# Patient Record
Sex: MALE | Race: WHITE | NOT HISPANIC OR LATINO | Employment: UNEMPLOYED | URBAN - NONMETROPOLITAN AREA
[De-identification: names, ages, dates, MRNs, and addresses within clinical notes are randomized per-mention and may not be internally consistent; named-entity substitution may affect disease eponyms.]

---

## 2017-04-01 ENCOUNTER — HOSPITAL ENCOUNTER (INPATIENT)
Facility: HOSPITAL | Age: 27
LOS: 2 days | Discharge: HOME | End: 2017-04-03
Attending: PSYCHIATRY & NEUROLOGY | Admitting: PSYCHIATRY & NEUROLOGY

## 2017-04-01 ENCOUNTER — HOSPITAL ENCOUNTER (EMERGENCY)
Facility: HOSPITAL | Age: 27
Discharge: STILL ADMITTED OR ADMINISTRATIVE | End: 2017-04-01
Attending: FAMILY MEDICINE

## 2017-04-01 VITALS
OXYGEN SATURATION: 99 % | WEIGHT: 190 LBS | DIASTOLIC BLOOD PRESSURE: 67 MMHG | TEMPERATURE: 97.9 F | RESPIRATION RATE: 16 BRPM | HEART RATE: 93 BPM | SYSTOLIC BLOOD PRESSURE: 120 MMHG | BODY MASS INDEX: 24.38 KG/M2 | HEIGHT: 74 IN

## 2017-04-01 DIAGNOSIS — R44.3 HALLUCINATION: Primary | ICD-10-CM

## 2017-04-01 DIAGNOSIS — R45.851 SUICIDAL IDEATION: ICD-10-CM

## 2017-04-01 PROBLEM — F33.3 MDD (MAJOR DEPRESSIVE DISORDER), RECURRENT, SEVERE, WITH PSYCHOSIS: Status: ACTIVE | Noted: 2017-04-01

## 2017-04-01 LAB
ALBUMIN SERPL-MCNC: 5.5 G/DL (ref 3.5–5)
ALBUMIN/GLOB SERPL: 1.9 G/DL (ref 1.5–2.5)
ALP SERPL-CCNC: 57 U/L (ref 40–129)
ALT SERPL W P-5'-P-CCNC: 10 U/L (ref 10–44)
AMPHET+METHAMPHET UR QL: POSITIVE
ANION GAP SERPL CALCULATED.3IONS-SCNC: 9.4 MMOL/L (ref 3.6–11.2)
AST SERPL-CCNC: 16 U/L (ref 10–34)
BARBITURATES UR QL SCN: NEGATIVE
BASOPHILS # BLD AUTO: 0.02 10*3/MM3 (ref 0–0.3)
BASOPHILS NFR BLD AUTO: 0.3 % (ref 0–2)
BENZODIAZ UR QL SCN: NEGATIVE
BILIRUB SERPL-MCNC: 0.7 MG/DL (ref 0.2–1.8)
BILIRUB UR QL STRIP: NEGATIVE
BUN BLD-MCNC: 12 MG/DL (ref 7–21)
BUN/CREAT SERPL: 10.7 (ref 7–25)
CALCIUM SPEC-SCNC: 10.3 MG/DL (ref 7.7–10)
CANNABINOIDS SERPL QL: POSITIVE
CHLORIDE SERPL-SCNC: 104 MMOL/L (ref 99–112)
CLARITY UR: CLEAR
CO2 SERPL-SCNC: 26.6 MMOL/L (ref 24.3–31.9)
COCAINE UR QL: NEGATIVE
COLOR UR: ABNORMAL
CREAT BLD-MCNC: 1.12 MG/DL (ref 0.43–1.29)
DEPRECATED RDW RBC AUTO: 42 FL (ref 37–54)
EOSINOPHIL # BLD AUTO: 0.06 10*3/MM3 (ref 0–0.7)
EOSINOPHIL NFR BLD AUTO: 0.8 % (ref 0–5)
ERYTHROCYTE [DISTWIDTH] IN BLOOD BY AUTOMATED COUNT: 13.4 % (ref 11.5–14.5)
ETHANOL BLD-MCNC: <10 MG/DL
ETHANOL UR QL: <0.01 %
GFR SERPL CREATININE-BSD FRML MDRD: 79 ML/MIN/1.73
GLOBULIN UR ELPH-MCNC: 2.9 GM/DL
GLUCOSE BLD-MCNC: 103 MG/DL (ref 70–110)
GLUCOSE UR STRIP-MCNC: NEGATIVE MG/DL
HAV IGM SERPL QL IA: NORMAL
HBV CORE IGM SERPL QL IA: NORMAL
HBV SURFACE AG SERPL QL IA: NORMAL
HCT VFR BLD AUTO: 43.9 % (ref 42–52)
HCV AB SER DONR QL: NORMAL
HGB BLD-MCNC: 15.3 G/DL (ref 14–18)
HGB UR QL STRIP.AUTO: NEGATIVE
HIV1+2 AB SER QL: NORMAL
IMM GRANULOCYTES # BLD: 0.01 10*3/MM3 (ref 0–0.03)
IMM GRANULOCYTES NFR BLD: 0.1 % (ref 0–0.5)
KETONES UR QL STRIP: ABNORMAL
LEUKOCYTE ESTERASE UR QL STRIP.AUTO: NEGATIVE
LYMPHOCYTES # BLD AUTO: 1.68 10*3/MM3 (ref 1–3)
LYMPHOCYTES NFR BLD AUTO: 21.3 % (ref 21–51)
MCH RBC QN AUTO: 29.9 PG (ref 27–33)
MCHC RBC AUTO-ENTMCNC: 34.9 G/DL (ref 33–37)
MCV RBC AUTO: 85.7 FL (ref 80–94)
METHADONE UR QL SCN: NEGATIVE
MONOCYTES # BLD AUTO: 1 10*3/MM3 (ref 0.1–0.9)
MONOCYTES NFR BLD AUTO: 12.7 % (ref 0–10)
NEUTROPHILS # BLD AUTO: 5.12 10*3/MM3 (ref 1.4–6.5)
NEUTROPHILS NFR BLD AUTO: 64.8 % (ref 30–70)
NITRITE UR QL STRIP: NEGATIVE
OPIATES UR QL: NEGATIVE
OSMOLALITY SERPL CALC.SUM OF ELEC: 279.4 MOSM/KG (ref 273–305)
OXYCODONE UR QL SCN: NEGATIVE
PCP UR QL SCN: NEGATIVE
PH UR STRIP.AUTO: 6.5 [PH] (ref 5–8)
PLATELET # BLD AUTO: 268 10*3/MM3 (ref 130–400)
PMV BLD AUTO: 10.5 FL (ref 6–10)
POTASSIUM BLD-SCNC: 4.5 MMOL/L (ref 3.5–5.3)
PROPOXYPH UR QL: NEGATIVE
PROT SERPL-MCNC: 8.4 G/DL (ref 6–8)
PROT UR QL STRIP: ABNORMAL
RBC # BLD AUTO: 5.12 10*6/MM3 (ref 4.7–6.1)
SODIUM BLD-SCNC: 140 MMOL/L (ref 135–153)
SP GR UR STRIP: 1.03 (ref 1–1.03)
UROBILINOGEN UR QL STRIP: ABNORMAL
WBC NRBC COR # BLD: 7.89 10*3/MM3 (ref 4.5–12.5)

## 2017-04-01 PROCEDURE — 80074 ACUTE HEPATITIS PANEL: CPT | Performed by: PSYCHIATRY & NEUROLOGY

## 2017-04-01 PROCEDURE — G0432 EIA HIV-1/HIV-2 SCREEN: HCPCS | Performed by: PSYCHIATRY & NEUROLOGY

## 2017-04-01 RX ORDER — ACETAMINOPHEN 325 MG/1
650 TABLET ORAL EVERY 4 HOURS PRN
Status: DISCONTINUED | OUTPATIENT
Start: 2017-04-01 | End: 2017-04-03 | Stop reason: HOSPADM

## 2017-04-01 RX ORDER — ONDANSETRON 4 MG/1
4 TABLET, FILM COATED ORAL EVERY 6 HOURS PRN
Status: DISCONTINUED | OUTPATIENT
Start: 2017-04-01 | End: 2017-04-03 | Stop reason: HOSPADM

## 2017-04-01 RX ORDER — NICOTINE 21 MG/24HR
1 PATCH, TRANSDERMAL 24 HOURS TRANSDERMAL DAILY
Status: DISCONTINUED | OUTPATIENT
Start: 2017-04-01 | End: 2017-04-03 | Stop reason: HOSPADM

## 2017-04-01 RX ORDER — BENZTROPINE MESYLATE 1 MG/1
1 TABLET ORAL DAILY PRN
Status: DISCONTINUED | OUTPATIENT
Start: 2017-04-01 | End: 2017-04-03 | Stop reason: HOSPADM

## 2017-04-01 RX ORDER — HYDROXYZINE 50 MG/1
50 TABLET, FILM COATED ORAL EVERY 6 HOURS PRN
Status: DISCONTINUED | OUTPATIENT
Start: 2017-04-01 | End: 2017-04-03 | Stop reason: HOSPADM

## 2017-04-01 RX ORDER — BENZTROPINE MESYLATE 1 MG/ML
0.5 INJECTION INTRAMUSCULAR; INTRAVENOUS DAILY PRN
Status: DISCONTINUED | OUTPATIENT
Start: 2017-04-01 | End: 2017-04-03 | Stop reason: HOSPADM

## 2017-04-01 RX ORDER — ECHINACEA PURPUREA EXTRACT 125 MG
2 TABLET ORAL AS NEEDED
Status: DISCONTINUED | OUTPATIENT
Start: 2017-04-01 | End: 2017-04-03 | Stop reason: HOSPADM

## 2017-04-01 RX ORDER — TRAZODONE HYDROCHLORIDE 50 MG/1
50 TABLET ORAL NIGHTLY PRN
Status: DISCONTINUED | OUTPATIENT
Start: 2017-04-01 | End: 2017-04-03 | Stop reason: HOSPADM

## 2017-04-01 RX ORDER — BENZONATATE 100 MG/1
100 CAPSULE ORAL 3 TIMES DAILY PRN
Status: DISCONTINUED | OUTPATIENT
Start: 2017-04-01 | End: 2017-04-03 | Stop reason: HOSPADM

## 2017-04-01 RX ORDER — ALUMINA, MAGNESIA, AND SIMETHICONE 2400; 2400; 240 MG/30ML; MG/30ML; MG/30ML
15 SUSPENSION ORAL EVERY 6 HOURS PRN
Status: DISCONTINUED | OUTPATIENT
Start: 2017-04-01 | End: 2017-04-03 | Stop reason: HOSPADM

## 2017-04-01 RX ORDER — FAMOTIDINE 20 MG/1
20 TABLET, FILM COATED ORAL 2 TIMES DAILY PRN
Status: DISCONTINUED | OUTPATIENT
Start: 2017-04-01 | End: 2017-04-03 | Stop reason: HOSPADM

## 2017-04-01 RX ORDER — MULTIVITAMIN
1 TABLET ORAL DAILY
Status: DISCONTINUED | OUTPATIENT
Start: 2017-04-01 | End: 2017-04-03 | Stop reason: HOSPADM

## 2017-04-01 RX ORDER — LOPERAMIDE HYDROCHLORIDE 2 MG/1
2 CAPSULE ORAL 4 TIMES DAILY PRN
Status: DISCONTINUED | OUTPATIENT
Start: 2017-04-01 | End: 2017-04-03 | Stop reason: HOSPADM

## 2017-04-01 RX ADMIN — HYDROXYZINE HYDROCHLORIDE 50 MG: 50 TABLET ORAL at 19:28

## 2017-04-01 RX ADMIN — TRAZODONE HYDROCHLORIDE 50 MG: 50 TABLET ORAL at 21:03

## 2017-04-01 RX ADMIN — Medication 1 TABLET: at 17:41

## 2017-04-01 RX ADMIN — NICOTINE 1 PATCH: 21 PATCH TRANSDERMAL at 17:40

## 2017-04-01 NOTE — NURSING NOTE
Patient pockets emptied. Thorough search complete. Undergarments searched by intake nurse and was witnessed by Seven  Per ED policy 100. The patient was placed in hospital attire. Belongings were logged on personal belongings sheet. Room was swept for any potential safety hazards room cleared and patient placed in treatment room

## 2017-04-02 RX ORDER — RISPERIDONE 0.25 MG/1
0.5 TABLET ORAL EVERY 12 HOURS SCHEDULED
Status: DISCONTINUED | OUTPATIENT
Start: 2017-04-02 | End: 2017-04-03 | Stop reason: HOSPADM

## 2017-04-02 RX ADMIN — TRAZODONE HYDROCHLORIDE 50 MG: 50 TABLET ORAL at 20:42

## 2017-04-02 RX ADMIN — HYDROXYZINE HYDROCHLORIDE 50 MG: 50 TABLET ORAL at 18:15

## 2017-04-02 RX ADMIN — RISPERIDONE 0.5 MG: 0.25 TABLET ORAL at 11:40

## 2017-04-02 RX ADMIN — NICOTINE 1 PATCH: 21 PATCH TRANSDERMAL at 09:12

## 2017-04-02 RX ADMIN — RISPERIDONE 0.5 MG: 0.25 TABLET ORAL at 20:42

## 2017-04-02 RX ADMIN — HYDROXYZINE HYDROCHLORIDE 50 MG: 50 TABLET ORAL at 09:36

## 2017-04-02 NOTE — PLAN OF CARE
Problem:  Patient Care Overview (Adult)  Goal: Plan of Care Review  Outcome: Ongoing (interventions implemented as appropriate)    04/02/17 1139   Coping/Psychosocial Response Interventions   Plan Of Care Reviewed With patient   Patient Care Overview   Progress improving   Coping/Psychosocial   Patient Agreement with Plan of Care agrees   Outcome Evaluation   Outcome Summary/Follow up Plan PT REPORTS DIFFICULTY STAYING ASLEEP LAST NIGHT. DENIES ANY SI, HI, OR HALLUCINATIONS. PT RATES ANXIETY 5 AND DEPRESSION 7 ON 0/10 SCALE. DENIES ANY WITHDRAWAL/CRAVING. PT IS WITHDRAWAN THIS SHIFT.          Problem:  Overarching Goals  Goal: Adheres to Safety Considerations for Self and Others  Outcome: Ongoing (interventions implemented as appropriate)  Goal: Optimized Coping Skills in Response to Life Stressors  Outcome: Ongoing (interventions implemented as appropriate)  Goal: Develops/Participates in Therapeutic [place] to Support Successful Transition  Outcome: Ongoing (interventions implemented as appropriate)

## 2017-04-02 NOTE — PLAN OF CARE
Problem:  Patient Care Overview (Adult)  Goal: Individualization and Mutuality  Outcome: Ongoing (interventions implemented as appropriate)    04/01/17 1626 04/02/17 1115   Behavioral Health Screens   Patient Personal Strengths --  motivated for treatment;motivated for recovery;spiritual/Gnosticism support;stable living environment   Patient Personal Strengths Comment --  willing to seek help    Patient Vulnerabilities --  ineffective coping    Individualization   Patient Specific Preferences --  none   Patient Specific Goals --  to get treatment    Patient Specific Interventions --  Individual and group therapy to focus on healthy coing and follow up care    Mutuality/Individual Preferences   What Anxieties, Fears or Concerns Do You Have About Your Health or Care? Anxiety about being away from wife- has not been apart in 5 years --    What Questions Do You Have About Your Health or Care? questions regarding LOS --    What Information Would Help Us Give You More Personalized Care? nothing --        Goal: Discharge Needs Assessment  Outcome: Ongoing (interventions implemented as appropriate)    04/01/17 1626 04/02/17 1115   Discharge Needs Assessment   Concerns To Be Addressed --  mental health concerns;relationship concerns;substance/tobacco abuse/use concerns   Readmission Within The Last 30 Days --  no previous admission in last 30 days   Community Agency Name(S) --  considering options    Current Discharge Risk --  psychiatric illness;substance abuse   Discharge Planning Comments --  to be determined    Discharge Needs Assessment   Outpatient/Agency/Support Group Needs --  outpatient substance abuse treatment (specify);outpatient psychiatric care (specify)   Anticipated Discharge Disposition --  home with family   Discharge Disposition --  home with family   Living Environment   Transportation Available car --       Met with patient for individual as well as with treatment team introduced self as assigned  therapist he was agreeable. Completed PSA  Treatment  plan and integrated summary. Discussed treatment objectives and briefly discussed disposition plans.      The patient presented to the ER having suicidal thoughts for the past 2 days with a plan to cut himself or to hang himself. He reports hearing voices talking to him from the TV telling him they are going to bury him in a hole and burn him. He reports smoking MJ since age [age] and using meth since age 11. He reports he smokes meth about once per mo 10.00 worth. He reports arguing with his wife about finances and the children.      Treatment team to stabilize patients symptoms, provide 24 hr nursing supervision and provide individual and group therapy to focus on healthy coping and follow up care. Patient will be encouraged to involve his family in his treatment.

## 2017-04-02 NOTE — PLAN OF CARE
Problem: BH Patient Care Overview (Adult)  Goal: Plan of Care Review  Outcome: Ongoing (interventions implemented as appropriate)  Pt is calm and cooperative. Rates anxiety 9/10 and depression 4/10. Denies A/V hallucinations although appears to respond to internal stimuli. Psychomotor agitation noted. Pt denies SI/HI. Denies craving/withdrawals. Slept well.    04/02/17 0408   Coping/Psychosocial Response Interventions   Plan Of Care Reviewed With patient   Patient Care Overview   Progress no change   Coping/Psychosocial   Patient Agreement with Plan of Care agrees
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
## 2017-04-03 VITALS
RESPIRATION RATE: 18 BRPM | HEIGHT: 74 IN | OXYGEN SATURATION: 99 % | BODY MASS INDEX: 21.66 KG/M2 | WEIGHT: 168.8 LBS | SYSTOLIC BLOOD PRESSURE: 106 MMHG | DIASTOLIC BLOOD PRESSURE: 59 MMHG | TEMPERATURE: 97 F | HEART RATE: 62 BPM

## 2017-04-03 LAB — BACTERIA SPEC AEROBE CULT: NORMAL

## 2017-04-03 PROCEDURE — 99238 HOSP IP/OBS DSCHRG MGMT 30/<: CPT | Performed by: PSYCHIATRY & NEUROLOGY

## 2017-04-03 RX ORDER — RISPERIDONE 0.5 MG/1
0.5 TABLET ORAL EVERY 12 HOURS SCHEDULED
Qty: 60 TABLET | Refills: 0 | Status: SHIPPED | OUTPATIENT
Start: 2017-04-03 | End: 2017-04-03

## 2017-04-03 RX ORDER — RISPERIDONE 1 MG/1
1 TABLET ORAL EVERY 12 HOURS SCHEDULED
Qty: 60 TABLET | Refills: 0 | Status: ON HOLD | OUTPATIENT
Start: 2017-04-03 | End: 2017-10-25

## 2017-04-03 RX ORDER — HYDROXYZINE 50 MG/1
50 TABLET, FILM COATED ORAL EVERY 6 HOURS PRN
Qty: 90 TABLET | Refills: 0 | Status: ON HOLD | OUTPATIENT
Start: 2017-04-03 | End: 2017-10-25

## 2017-04-03 RX ORDER — TRAZODONE HYDROCHLORIDE 50 MG/1
50 TABLET ORAL NIGHTLY PRN
Qty: 30 TABLET | Refills: 0 | Status: ON HOLD | OUTPATIENT
Start: 2017-04-03 | End: 2017-10-25

## 2017-04-03 RX ADMIN — NICOTINE 1 PATCH: 21 PATCH TRANSDERMAL at 09:47

## 2017-04-03 RX ADMIN — HYDROXYZINE HYDROCHLORIDE 50 MG: 50 TABLET ORAL at 14:06

## 2017-04-03 NOTE — DISCHARGE INSTR - APPOINTMENTS
ROXANNE Hernandez  [address]  [place] [address]  [phone]    April 5 2017 at 2:00pm with Aide

## 2017-04-03 NOTE — PROGRESS NOTES
10:45  Met with patient for individual he reports feeling better today. He discussed his reason for admission and states he feels like he used flakka or bath salts rather than meth and this contributed to his symptoms. He discussed stressors as financial problems and not working for the past 2 weeks. He discussed his long history of substance abuse however is agreeable to follow up with outpatient care.  He feels like he is ready to go home today and is agreeable to follow up with ROXANNE Hernandez. We discussed relapse prevention and AA/NA meetings and ways to reduce stress rather than use substances.     Patient denies suicidal thoughts, denies A/V hallucinations. He rates anxiety at 3 and denies depression. He feels like medications has helped him as well as improved sleep.       Patient is agreeable to follow up with ROXANNE Hernandez.

## 2017-04-03 NOTE — DISCHARGE SUMMARY
Date of Discharge:  4/3/2017    Discharge Diagnosis:Active Problems:    MDD (major depressive disorder), recurrent, severe, with psychosis  Methamphetamine abuse  Cannabis abuse      Presenting Problem/History of Present Illness  MDD (major depressive disorder), recurrent, severe, with psychosis [F33.3]     Hospital Course  Patient is a 26 y.o. male presented with depression and methamphetamine abuse.  He also presented with paranoia and auditory hallucinations regarding the TV.  He was admitted for safety and stabilization and started on risperidone 0.5 mg twice daily.  He found this, trazodone, and hydroxyzine very helpful.  On the second hospital day, he felt that his mood was improved and denied auditory or visual hallucinations.  He also denied beingparanoid.  He still had some mild paranoia regarding the possibility that his mother was a recording when he talked to her on the phone the night prior to discharge.  On the day of discharge, he recognizes that this may be was in his head, he denied other paranoid thoughts and he did not demonstrate any other paranoia during my evaluation.  On day of discharge, his affect was euthymic and stable, his thought content was goal directed, his thought process was linear, he denied suicidal or homicidal ideation and denied perceptual disturbances.  He requested to discharge home and at this point it was felt that he was not holdable and he was willing to get additional outpatient treatment.  Risperdal was increased to 1 mg twice daily on day of discharge.  It was thought that many of his symptoms where is the result of methamphetamine abuse and we also encouraged him to consider outpatient treatment.  He was only interested in NA at time of discharge.  He will follow up with comprehensive care in [place]..    Procedures Performed         Consults:   Consults     No orders found from 3/3/2017 to 4/2/2017.          Pertinent Test Results: labs: CMP, CBC, UA were  unremarkable . HIV, Hepatitis panel were negative. UDS was positive for amphetamines and THC    Condition on Discharge:  stable    Vital Signs  Temp:  [97.6 °F (36.4 °C)-98 °F (36.7 °C)] 97.6 °F (36.4 °C)  Heart Rate:  [51-61] 51  Resp:  [18] 18  BP: ()/(60-72) 96/60      Discharge Disposition  Home or Self Care    Discharge Medications   Maico Wilcox   Home Medication Instructions RASHARD:[id number]    Printed on:04/03/17 1454   Medication Information                      hydrOXYzine (ATARAX) 50 MG tablet  Take 1 tablet by mouth Every 6 (Six) Hours As Needed for Anxiety.             risperiDONE (risperDAL) 1 MG tablet  Take 1 tablet by mouth Every 12 (Twelve) Hours.             traZODone (DESYREL) 50 MG tablet  Take 1 tablet by mouth At Night As Needed for Sleep.                 Discharge Diet: regular    Activity at Discharge: as tolerated    Follow-up Appointments  Comp care in [place]      Test Results Pending at Discharge       Beka Biggs MD  04/03/17  2:59 PM

## 2017-04-03 NOTE — PLAN OF CARE
Problem: BH Patient Care Overview (Adult)  Goal: Plan of Care Review  Outcome: Ongoing (interventions implemented as appropriate)  Pt is calm and cooperative. Rates anxiety 10/10 and depression 7/10. Denies A/V hallucinations although appears to respond to internal stimuli. Psychomotor agitation noted, appears paranoid. Speech is rambling with flight of ideas. Pt denies SI/HI. Denies craving/withdrawals. Slept well through the night.     04/03/17 0436   Coping/Psychosocial Response Interventions   Plan Of Care Reviewed With patient   Patient Care Overview   Progress no change   Coping/Psychosocial   Patient Agreement with Plan of Care agrees

## 2017-04-09 NOTE — ED PROVIDER NOTES
Subjective   Patient is a 26 y.o. male presenting with mental health disorder.   History provided by:  Patient  Mental Health Problem   Presenting symptoms: hallucinations and suicidal thoughts    Onset quality:  Gradual  Timing:  Intermittent  Progression:  Worsening  Chronicity:  New  Context: stressful life event    Treatment compliance:  Untreated  Relieved by:  Nothing  Worsened by:  Nothing  Ineffective treatments:  None tried  Associated symptoms: anxiety    Risk factors: hx of mental illness        Review of Systems   Psychiatric/Behavioral: Positive for hallucinations and suicidal ideas. The patient is nervous/anxious.        Past Medical History:   Diagnosis Date   • Anxiety    • Depression    • Self-injurious behavior     over 1 year ago with burning injurious behaviors   • Substance abuse    • Suicidal thoughts    • Suicide attempt     about 6 years ago; 60 xanaxs and cut self did not have to seek medical attention    • Tumors     lymphoid tumors       Allergies   Allergen Reactions   • Reglan [Metoclopramide] Other (See Comments)     hypertension       Past Surgical History:   Procedure Laterality Date   • HAND SURGERY Left     removed a piece of his muscle       Family History   Problem Relation Age of Onset   • Anxiety disorder Mother    • Bipolar disorder Mother    • Depression Mother    • Drug abuse Mother    • OCD Mother    • Paranoid behavior Mother    • Schizophrenia Mother    • Alcohol abuse Father    • Anxiety disorder Father    • Depression Father    • Drug abuse Father    • OCD Father    • Paranoid behavior Father    • Alcohol abuse Brother    • Alcohol abuse Maternal Uncle    • Alcohol abuse Paternal Uncle    • Alcohol abuse Maternal Grandfather    • Alcohol abuse Paternal Grandfather    • Alcohol abuse Paternal Grandmother    • ADD / ADHD Neg Hx    • Dementia Neg Hx    • Seizures Neg Hx    • Self-Injurious Behavior  Neg Hx    • Suicide Attempts Neg Hx        Social History     Social  History   • Marital status:      Spouse name: N/A   • Number of children: N/A   • Years of education: N/A     Social History Main Topics   • Smoking status: Current Every Day Smoker     Packs/day: 1.00     Years: 17.00     Types: Cigarettes     Start date: 4/1/2000   • Smokeless tobacco: Current User     Types: Snuff      Comment: 1 can a week   • Alcohol use No   • Drug use: Yes     Special: Amphetamines, Marijuana   • Sexual activity: Yes     Partners: Female     Other Topics Concern   • None     Social History Narrative           Objective   Physical Exam   Constitutional: He is oriented to person, place, and time. He appears well-developed and well-nourished.   HENT:   Head: Normocephalic.   Right Ear: External ear normal.   Left Ear: External ear normal.   Nose: Nose normal.   Eyes: Pupils are equal, round, and reactive to light.   Neck: Neck supple.   Cardiovascular: Normal rate and regular rhythm.    Pulmonary/Chest: Effort normal and breath sounds normal.   Abdominal: Soft. Bowel sounds are normal.   Musculoskeletal: Normal range of motion.   Neurological: He is alert and oriented to person, place, and time.   Skin: Skin is warm and dry.   Psychiatric: His speech is normal. His mood appears anxious. He is withdrawn and actively hallucinating. Thought content is paranoid. He expresses suicidal ideation.   Nursing note and vitals reviewed.      Procedures         ED Course  ED Course                  MDM  Number of Diagnoses or Management Options  Hallucination: established and worsening  Suicidal ideation: new and requires workup     Amount and/or Complexity of Data Reviewed  Clinical lab tests: ordered and reviewed  Tests in the radiology section of CPT®: ordered and reviewed  Tests in the medicine section of CPT®: ordered and reviewed  Independent visualization of images, tracings, or specimens: yes    Risk of Complications, Morbidity, and/or Mortality  Presenting problems: moderate  Diagnostic  procedures: moderate  Management options: moderate    Patient Progress  Patient progress: stable      Final diagnoses:   Hallucination   Suicidal ideation            Lindsay Nur,   04/09/17 075

## 2017-10-25 ENCOUNTER — HOSPITAL ENCOUNTER (EMERGENCY)
Facility: HOSPITAL | Age: 27
Discharge: STILL ADMITTED OR ADMINISTRATIVE | End: 2017-10-25
Attending: FAMILY MEDICINE

## 2017-10-25 ENCOUNTER — HOSPITAL ENCOUNTER (INPATIENT)
Facility: HOSPITAL | Age: 27
LOS: 5 days | Discharge: HOME | End: 2017-10-30
Attending: FAMILY MEDICINE | Admitting: PSYCHIATRY & NEUROLOGY

## 2017-10-25 VITALS
OXYGEN SATURATION: 100 % | WEIGHT: 186 LBS | RESPIRATION RATE: 18 BRPM | SYSTOLIC BLOOD PRESSURE: 119 MMHG | HEIGHT: 74 IN | DIASTOLIC BLOOD PRESSURE: 70 MMHG | HEART RATE: 85 BPM | TEMPERATURE: 98.1 F | BODY MASS INDEX: 23.87 KG/M2

## 2017-10-25 DIAGNOSIS — F32.A DEPRESSION, UNSPECIFIED DEPRESSION TYPE: Primary | ICD-10-CM

## 2017-10-25 PROBLEM — F29 PSYCHOSIS (HCC): Status: ACTIVE | Noted: 2017-10-25

## 2017-10-25 LAB
6-ACETYL MORPHINE: NEGATIVE
ALBUMIN SERPL-MCNC: 5.1 G/DL (ref 3.5–5)
ALBUMIN/GLOB SERPL: 1.5 G/DL (ref 1.5–2.5)
ALP SERPL-CCNC: 72 U/L (ref 40–129)
ALT SERPL W P-5'-P-CCNC: 20 U/L (ref 10–44)
AMPHET+METHAMPHET UR QL: POSITIVE
ANION GAP SERPL CALCULATED.3IONS-SCNC: 9.5 MMOL/L (ref 3.6–11.2)
AST SERPL-CCNC: 30 U/L (ref 10–34)
BARBITURATES UR QL SCN: NEGATIVE
BASOPHILS # BLD AUTO: 0.02 10*3/MM3 (ref 0–0.3)
BASOPHILS NFR BLD AUTO: 0.1 % (ref 0–2)
BENZODIAZ UR QL SCN: NEGATIVE
BILIRUB SERPL-MCNC: 0.6 MG/DL (ref 0.2–1.8)
BILIRUB UR QL STRIP: NEGATIVE
BUN BLD-MCNC: 13 MG/DL (ref 7–21)
BUN/CREAT SERPL: 12.3 (ref 7–25)
BUPRENORPHINE SERPL-MCNC: NEGATIVE NG/ML
CALCIUM SPEC-SCNC: 9.8 MG/DL (ref 7.7–10)
CANNABINOIDS SERPL QL: POSITIVE
CHLORIDE SERPL-SCNC: 104 MMOL/L (ref 99–112)
CLARITY UR: CLEAR
CO2 SERPL-SCNC: 25.5 MMOL/L (ref 24.3–31.9)
COCAINE UR QL: NEGATIVE
COLOR UR: YELLOW
CREAT BLD-MCNC: 1.06 MG/DL (ref 0.43–1.29)
DEPRECATED RDW RBC AUTO: 47.1 FL (ref 37–54)
EOSINOPHIL # BLD AUTO: 0.14 10*3/MM3 (ref 0–0.7)
EOSINOPHIL NFR BLD AUTO: 1 % (ref 0–5)
ERYTHROCYTE [DISTWIDTH] IN BLOOD BY AUTOMATED COUNT: 14.9 % (ref 11.5–14.5)
ETHANOL BLD-MCNC: <10 MG/DL
ETHANOL UR QL: <0.01 %
GFR SERPL CREATININE-BSD FRML MDRD: 83 ML/MIN/1.73
GLOBULIN UR ELPH-MCNC: 3.3 GM/DL
GLUCOSE BLD-MCNC: 108 MG/DL (ref 70–110)
GLUCOSE UR STRIP-MCNC: NEGATIVE MG/DL
HCT VFR BLD AUTO: 44.5 % (ref 42–52)
HGB BLD-MCNC: 15.2 G/DL (ref 14–18)
HGB UR QL STRIP.AUTO: NEGATIVE
IMM GRANULOCYTES # BLD: 0.02 10*3/MM3 (ref 0–0.03)
IMM GRANULOCYTES NFR BLD: 0.1 % (ref 0–0.5)
KETONES UR QL STRIP: NEGATIVE
LEUKOCYTE ESTERASE UR QL STRIP.AUTO: NEGATIVE
LYMPHOCYTES # BLD AUTO: 2.08 10*3/MM3 (ref 1–3)
LYMPHOCYTES NFR BLD AUTO: 14.6 % (ref 21–51)
MAGNESIUM SERPL-MCNC: 2.1 MG/DL (ref 1.7–2.6)
MCH RBC QN AUTO: 29.4 PG (ref 27–33)
MCHC RBC AUTO-ENTMCNC: 34.2 G/DL (ref 33–37)
MCV RBC AUTO: 86.1 FL (ref 80–94)
METHADONE UR QL SCN: NEGATIVE
MONOCYTES # BLD AUTO: 1.2 10*3/MM3 (ref 0.1–0.9)
MONOCYTES NFR BLD AUTO: 8.4 % (ref 0–10)
NEUTROPHILS # BLD AUTO: 10.77 10*3/MM3 (ref 1.4–6.5)
NEUTROPHILS NFR BLD AUTO: 75.8 % (ref 30–70)
NITRITE UR QL STRIP: NEGATIVE
OPIATES UR QL: NEGATIVE
OSMOLALITY SERPL CALC.SUM OF ELEC: 278.2 MOSM/KG (ref 273–305)
OXYCODONE UR QL SCN: NEGATIVE
PCP UR QL SCN: NEGATIVE
PH UR STRIP.AUTO: <=5 [PH] (ref 5–8)
PLATELET # BLD AUTO: 327 10*3/MM3 (ref 130–400)
PMV BLD AUTO: 9.8 FL (ref 6–10)
POTASSIUM BLD-SCNC: 3.6 MMOL/L (ref 3.5–5.3)
PROT SERPL-MCNC: 8.4 G/DL (ref 6–8)
PROT UR QL STRIP: NEGATIVE
RBC # BLD AUTO: 5.17 10*6/MM3 (ref 4.7–6.1)
SODIUM BLD-SCNC: 139 MMOL/L (ref 135–153)
SP GR UR STRIP: 1.02 (ref 1–1.03)
UROBILINOGEN UR QL STRIP: NORMAL
WBC NRBC COR # BLD: 14.23 10*3/MM3 (ref 4.5–12.5)

## 2017-10-25 PROCEDURE — 80307 DRUG TEST PRSMV CHEM ANLYZR: CPT | Performed by: FAMILY MEDICINE

## 2017-10-25 PROCEDURE — 93005 ELECTROCARDIOGRAM TRACING: CPT | Performed by: PSYCHIATRY & NEUROLOGY

## 2017-10-25 PROCEDURE — 83735 ASSAY OF MAGNESIUM: CPT | Performed by: FAMILY MEDICINE

## 2017-10-25 PROCEDURE — 81003 URINALYSIS AUTO W/O SCOPE: CPT | Performed by: FAMILY MEDICINE

## 2017-10-25 PROCEDURE — 80053 COMPREHEN METABOLIC PANEL: CPT | Performed by: FAMILY MEDICINE

## 2017-10-25 PROCEDURE — 85025 COMPLETE CBC W/AUTO DIFF WBC: CPT | Performed by: FAMILY MEDICINE

## 2017-10-25 PROCEDURE — HZ2ZZZZ DETOXIFICATION SERVICES FOR SUBSTANCE ABUSE TREATMENT: ICD-10-PCS | Performed by: PSYCHIATRY & NEUROLOGY

## 2017-10-25 PROCEDURE — 93010 ELECTROCARDIOGRAM REPORT: CPT | Performed by: INTERNAL MEDICINE

## 2017-10-25 RX ORDER — NICOTINE 21 MG/24HR
1 PATCH, TRANSDERMAL 24 HOURS TRANSDERMAL EVERY 24 HOURS
Status: DISCONTINUED | OUTPATIENT
Start: 2017-10-25 | End: 2017-10-30 | Stop reason: HOSPADM

## 2017-10-25 RX ORDER — BENZTROPINE MESYLATE 1 MG/ML
0.5 INJECTION INTRAMUSCULAR; INTRAVENOUS DAILY PRN
Status: DISCONTINUED | OUTPATIENT
Start: 2017-10-25 | End: 2017-10-30 | Stop reason: HOSPADM

## 2017-10-25 RX ORDER — BENZTROPINE MESYLATE 1 MG/1
1 TABLET ORAL DAILY PRN
Status: DISCONTINUED | OUTPATIENT
Start: 2017-10-25 | End: 2017-10-30 | Stop reason: HOSPADM

## 2017-10-25 RX ORDER — ALUMINA, MAGNESIA, AND SIMETHICONE 2400; 2400; 240 MG/30ML; MG/30ML; MG/30ML
15 SUSPENSION ORAL EVERY 6 HOURS PRN
Status: DISCONTINUED | OUTPATIENT
Start: 2017-10-25 | End: 2017-10-30 | Stop reason: HOSPADM

## 2017-10-25 RX ORDER — HYDROXYZINE 50 MG/1
50 TABLET, FILM COATED ORAL EVERY 6 HOURS PRN
Status: DISCONTINUED | OUTPATIENT
Start: 2017-10-25 | End: 2017-10-30 | Stop reason: HOSPADM

## 2017-10-25 RX ORDER — TRAZODONE HYDROCHLORIDE 50 MG/1
50 TABLET ORAL NIGHTLY PRN
Status: DISCONTINUED | OUTPATIENT
Start: 2017-10-25 | End: 2017-10-30 | Stop reason: HOSPADM

## 2017-10-25 RX ORDER — ECHINACEA PURPUREA EXTRACT 125 MG
2 TABLET ORAL AS NEEDED
Status: DISCONTINUED | OUTPATIENT
Start: 2017-10-25 | End: 2017-10-30 | Stop reason: HOSPADM

## 2017-10-25 RX ORDER — ONDANSETRON 4 MG/1
4 TABLET, FILM COATED ORAL EVERY 6 HOURS PRN
Status: DISCONTINUED | OUTPATIENT
Start: 2017-10-25 | End: 2017-10-30 | Stop reason: HOSPADM

## 2017-10-25 RX ORDER — ACETAMINOPHEN 325 MG/1
650 TABLET ORAL EVERY 4 HOURS PRN
Status: DISCONTINUED | OUTPATIENT
Start: 2017-10-25 | End: 2017-10-30 | Stop reason: HOSPADM

## 2017-10-25 RX ORDER — BENZONATATE 100 MG/1
100 CAPSULE ORAL 3 TIMES DAILY PRN
Status: DISCONTINUED | OUTPATIENT
Start: 2017-10-25 | End: 2017-10-30 | Stop reason: HOSPADM

## 2017-10-25 RX ORDER — FAMOTIDINE 20 MG/1
20 TABLET, FILM COATED ORAL 2 TIMES DAILY PRN
Status: DISCONTINUED | OUTPATIENT
Start: 2017-10-25 | End: 2017-10-30 | Stop reason: HOSPADM

## 2017-10-25 RX ORDER — LOPERAMIDE HYDROCHLORIDE 2 MG/1
2 CAPSULE ORAL 4 TIMES DAILY PRN
Status: DISCONTINUED | OUTPATIENT
Start: 2017-10-25 | End: 2017-10-30 | Stop reason: HOSPADM

## 2017-10-26 RX ORDER — RISPERIDONE 0.25 MG/1
0.5 TABLET ORAL EVERY 12 HOURS SCHEDULED
Status: DISCONTINUED | OUTPATIENT
Start: 2017-10-26 | End: 2017-10-30 | Stop reason: HOSPADM

## 2017-10-26 RX ADMIN — SERTRALINE 50 MG: 50 TABLET, FILM COATED ORAL at 14:26

## 2017-10-28 PROBLEM — F15.14: Status: ACTIVE | Noted: 2017-10-25

## 2017-10-28 PROBLEM — F12.20 CANNABIS DEPENDENCE, CONTINUOUS: Status: ACTIVE | Noted: 2017-10-28

## 2017-10-28 PROBLEM — F10.10 ALCOHOL ABUSE, EPISODIC: Status: ACTIVE | Noted: 2017-10-28

## 2017-10-28 PROCEDURE — 99232 SBSQ HOSP IP/OBS MODERATE 35: CPT | Performed by: PSYCHIATRY & NEUROLOGY

## 2017-10-29 PROCEDURE — 99231 SBSQ HOSP IP/OBS SF/LOW 25: CPT | Performed by: PSYCHIATRY & NEUROLOGY

## 2017-10-29 RX ADMIN — SERTRALINE 50 MG: 50 TABLET, FILM COATED ORAL at 09:08

## 2017-10-30 VITALS
HEIGHT: 74 IN | SYSTOLIC BLOOD PRESSURE: 115 MMHG | OXYGEN SATURATION: 98 % | BODY MASS INDEX: 22.46 KG/M2 | RESPIRATION RATE: 18 BRPM | DIASTOLIC BLOOD PRESSURE: 59 MMHG | TEMPERATURE: 98.2 F | WEIGHT: 175 LBS | HEART RATE: 51 BPM

## 2017-10-30 RX ORDER — RISPERIDONE 0.5 MG/1
0.5 TABLET ORAL EVERY 12 HOURS SCHEDULED
Qty: 14 TABLET | Refills: 0 | Status: SHIPPED | OUTPATIENT
Start: 2017-10-30 | End: 2017-11-06

## 2017-10-30 RX ADMIN — SERTRALINE 50 MG: 50 TABLET, FILM COATED ORAL at 09:15

## 2023-03-22 ENCOUNTER — TRANSCRIBE ORDERS (OUTPATIENT)
Dept: ADMINISTRATIVE | Facility: HOSPITAL | Age: 33
End: 2023-03-22
Payer: COMMERCIAL

## 2023-03-22 DIAGNOSIS — N50.812 TESTICULAR PAIN, LEFT: Primary | ICD-10-CM

## 2023-04-03 ENCOUNTER — HOSPITAL ENCOUNTER (OUTPATIENT)
Dept: ULTRASOUND IMAGING | Facility: HOSPITAL | Age: 33
Discharge: HOME | End: 2023-04-03
Admitting: PHYSICIAN ASSISTANT
Payer: COMMERCIAL

## 2023-04-03 DIAGNOSIS — N50.812 TESTICULAR PAIN, LEFT: ICD-10-CM

## 2023-04-03 PROCEDURE — 76870 US EXAM SCROTUM: CPT | Performed by: RADIOLOGY

## 2023-04-03 PROCEDURE — 76870 US EXAM SCROTUM: CPT

## 2023-04-25 ENCOUNTER — OFFICE VISIT (OUTPATIENT)
Dept: UROLOGY | Facility: CLINIC | Age: 33
End: 2023-04-25
Payer: COMMERCIAL

## 2023-04-25 VITALS
SYSTOLIC BLOOD PRESSURE: 110 MMHG | HEIGHT: 74 IN | DIASTOLIC BLOOD PRESSURE: 75 MMHG | WEIGHT: 190 LBS | HEART RATE: 77 BPM | BODY MASS INDEX: 24.38 KG/M2

## 2023-04-25 DIAGNOSIS — R10.32 LEFT LOWER QUADRANT ABDOMINAL PAIN: ICD-10-CM

## 2023-04-25 DIAGNOSIS — N50.812 LEFT TESTICULAR PAIN: Primary | ICD-10-CM

## 2023-04-25 PROCEDURE — 1159F MED LIST DOCD IN RCRD: CPT

## 2023-04-25 PROCEDURE — 99203 OFFICE O/P NEW LOW 30 MIN: CPT

## 2023-04-25 PROCEDURE — 1160F RVW MEDS BY RX/DR IN RCRD: CPT

## 2023-04-25 RX ORDER — TRAZODONE HYDROCHLORIDE 100 MG/1
100 TABLET ORAL
COMMUNITY
Start: 2023-04-06

## 2023-04-25 RX ORDER — RISPERIDONE 4 MG/1
1 TABLET ORAL EVERY 12 HOURS SCHEDULED
COMMUNITY
Start: 2023-04-06

## 2023-04-25 NOTE — PROGRESS NOTES
"Chief Complaint:    Chief Complaint   Patient presents with   • Testicle Pain     New patient        Vital Signs:   /75   Pulse 77   Ht 188 cm (74\")   Wt 86.2 kg (190 lb)   BMI 24.39 kg/m²   Body mass index is 24.39 kg/m².      HPI:  Maico Wilcox is a 32 y.o. male who presents today for initial evaluation     History of Present Illness  Mr. Wilcox is being referred to us by Jennifer CHAVEZ for initial evaluation of testicular pain.  He has a past medical history significant for anxiety, depression, and substance abuse.  He states that he has had intermittent left and right-sided testicular pain since July 2022.  He denies any testicular trauma.  States that he does a lot of heavy lifting with his job and he remembers lifting several large rocks prior to beginning of his testicular pain.  Denies any alleviating or contributing factors.  He had an ultrasound completed at the beginning of this month that showed only tiny bilateral hydroceles.  There was no testicular torsion, testicular masses, epididymitis, or hepatitis, or other concerns on ultrasound.  Patient is also concerned for a hernia.  States he was diagnosed with 1 by his primary care by  Roughly 2 years ago.  Physical exam today is completely normal.      Past Medical History:  Past Medical History:   Diagnosis Date   • Anxiety    • Depression    • Self-injurious behavior     burning injurious behaviors by burning self with Cigarrette in the past 3 months   • Substance abuse    • Suicidal thoughts    • Suicide attempt     about 6 years ago; 60 xanaxs and cut self did not have to seek medical attention    • Tumors     lymphoid tumors   • Withdrawal symptoms, alcohol    • Withdrawal symptoms, drug or narcotic        Current Meds:  Current Outpatient Medications   Medication Sig Dispense Refill   • risperiDONE (risperDAL) 4 MG tablet Take 1 tablet by mouth Every 12 (Twelve) Hours.     • traZODone (DESYREL) 100 MG tablet Take 1 tablet by mouth " every night at bedtime.       No current facility-administered medications for this visit.        Allergies:   Allergies   Allergen Reactions   • Reglan [Metoclopramide] Other (See Comments)     hypertension        Past Surgical History:  Past Surgical History:   Procedure Laterality Date   • HAND SURGERY Left     removed a piece of his muscle       Social History:  Social History     Socioeconomic History   • Marital status:    Tobacco Use   • Smoking status: Every Day     Packs/day: 1.00     Years: 17.00     Pack years: 17.00     Types: Cigarettes     Start date: 4/1/2000   • Smokeless tobacco: Current   Substance and Sexual Activity   • Alcohol use: No   • Drug use: Yes     Types: Amphetamines, Marijuana   • Sexual activity: Yes     Partners: Female       Family History:  Family History   Problem Relation Age of Onset   • Anxiety disorder Mother    • Bipolar disorder Mother    • Depression Mother    • Drug abuse Mother    • OCD Mother    • Paranoid behavior Mother    • Schizophrenia Mother    • Alcohol abuse Father    • Anxiety disorder Father    • Depression Father    • Drug abuse Father    • OCD Father    • Paranoid behavior Father    • Alcohol abuse Brother    • Alcohol abuse Maternal Uncle    • Alcohol abuse Paternal Uncle    • Alcohol abuse Maternal Grandfather    • Alcohol abuse Paternal Grandfather    • Alcohol abuse Paternal Grandmother    • ADD / ADHD Neg Hx    • Dementia Neg Hx    • Seizures Neg Hx    • Self-Injurious Behavior  Neg Hx    • Suicide Attempts Neg Hx        Review of Systems:  Review of Systems   Constitutional: Positive for fatigue. Negative for chills, fever and unexpected weight change.   HENT: Negative for congestion and sinus pressure.    Respiratory: Negative for chest tightness and shortness of breath.    Cardiovascular: Negative for chest pain.   Gastrointestinal: Negative for abdominal pain, constipation, diarrhea, nausea and vomiting.   Genitourinary: Positive for  testicular pain. Negative for difficulty urinating, dysuria, flank pain, frequency, hematuria, scrotal swelling and urgency.   Musculoskeletal: Negative for back pain and neck pain.   Skin: Negative for rash.   Neurological: Negative for dizziness and headaches.   Hematological: Does not bruise/bleed easily.   Psychiatric/Behavioral: Negative for confusion and suicidal ideas. The patient is not nervous/anxious.        Physical Exam:  Physical Exam  Constitutional:       General: He is not in acute distress.     Appearance: Normal appearance.   HENT:      Head: Normocephalic and atraumatic.      Nose: Nose normal.      Mouth/Throat:      Mouth: Mucous membranes are moist.   Eyes:      Conjunctiva/sclera: Conjunctivae normal.   Cardiovascular:      Rate and Rhythm: Normal rate and regular rhythm.      Pulses: Normal pulses.      Heart sounds: Normal heart sounds.   Pulmonary:      Effort: Pulmonary effort is normal.      Breath sounds: Normal breath sounds.   Abdominal:      General: Bowel sounds are normal.      Palpations: Abdomen is soft.      Hernia: No hernia is present.   Genitourinary:     Penis: Normal.       Testes: Normal.   Musculoskeletal:         General: Normal range of motion.      Cervical back: Normal range of motion.   Skin:     General: Skin is warm.   Neurological:      General: No focal deficit present.      Mental Status: He is alert and oriented to person, place, and time.   Psychiatric:         Mood and Affect: Mood normal.         Behavior: Behavior normal.         Thought Content: Thought content normal.         Judgment: Judgment normal.         Recent Image (CT and/or KUB):   CT Abdomen and Pelvis: No results found for this or any previous visit.     CT Stone Protocol: No results found for this or any previous visit.     KUB: No results found for this or any previous visit.       Labs:  Brief Urine Lab Results     None        No visits with results within 3 Month(s) from this visit.    Latest known visit with results is:   Admission on 10/25/2017, Discharged on 10/25/2017   Component Date Value Ref Range Status   • Glucose 10/25/2017 108  70 - 110 mg/dL Final   • BUN 10/25/2017 13  7 - 21 mg/dL Final   • Creatinine 10/25/2017 1.06  0.43 - 1.29 mg/dL Final   • Sodium 10/25/2017 139  135 - 153 mmol/L Final   • Potassium 10/25/2017 3.6  3.5 - 5.3 mmol/L Final   • Chloride 10/25/2017 104  99 - 112 mmol/L Final   • CO2 10/25/2017 25.5  24.3 - 31.9 mmol/L Final   • Calcium 10/25/2017 9.8  7.7 - 10.0 mg/dL Final   • Total Protein 10/25/2017 8.4 (H)  6.0 - 8.0 g/dL Final   • Albumin 10/25/2017 5.10 (H)  3.50 - 5.00 g/dL Final   • ALT (SGPT) 10/25/2017 20  10 - 44 U/L Final   • AST (SGOT) 10/25/2017 30  10 - 34 U/L Final   • Alkaline Phosphatase 10/25/2017 72  40 - 129 U/L Final    Note New Reference Ranges   • Total Bilirubin 10/25/2017 0.6  0.2 - 1.8 mg/dL Final   • eGFR Non  Amer 10/25/2017 83  >60 mL/min/1.73 Final   • Globulin 10/25/2017 3.3  gm/dL Final   • A/G Ratio 10/25/2017 1.5  1.5 - 2.5 g/dL Final   • BUN/Creatinine Ratio 10/25/2017 12.3  7.0 - 25.0 Final   • Anion Gap 10/25/2017 9.5  3.6 - 11.2 mmol/L Final   • Color, UA 10/25/2017 Yellow  Yellow, Straw Final   • Appearance, UA 10/25/2017 Clear  Clear Final   • pH, UA 10/25/2017 <=5.0  5.0 - 8.0 Final   • Specific Gravity, UA 10/25/2017 1.020  1.005 - 1.030 Final   • Glucose, UA 10/25/2017 Negative  Negative Final   • Ketones, UA 10/25/2017 Negative  Negative Final   • Bilirubin, UA 10/25/2017 Negative  Negative Final   • Blood, UA 10/25/2017 Negative  Negative Final   • Protein, UA 10/25/2017 Negative  Negative Final   • Leuk Esterase, UA 10/25/2017 Negative  Negative Final   • Nitrite, UA 10/25/2017 Negative  Negative Final   • Urobilinogen, UA 10/25/2017 0.2 E.U./dL  0.2 - 1.0 E.U./dL Final   • Amphetamine Screen, Urine 10/25/2017 Positive (A)  Negative Final   • Barbiturates Screen, Urine 10/25/2017 Negative  Negative Final   •  Benzodiazepine Screen, Urine 10/25/2017 Negative  Negative Final   • Cocaine Screen, Urine 10/25/2017 Negative  Negative Final   • Methadone Screen, Urine 10/25/2017 Negative  Negative Final   • Opiate Screen 10/25/2017 Negative  Negative Final   • Phencyclidine (PCP), Urine 10/25/2017 Negative  Negative Final   • THC, Screen, Urine 10/25/2017 Positive (A)  Negative Final   • 6-ACETYL MORPHINE 10/25/2017 Negative  Negative Final   • Buprenorphine, Screen, Urine 10/25/2017 Negative  Negative Final   • Oxycodone Screen, Urine 10/25/2017 Negative  Negative Final   • Ethanol 10/25/2017 <10  <=10 mg/dL Final   • Ethanol % 10/25/2017 <0.010  % Final   • Magnesium 10/25/2017 2.1  1.7 - 2.6 mg/dL Final   • WBC 10/25/2017 14.23 (H)  4.50 - 12.50 10*3/mm3 Final   • RBC 10/25/2017 5.17  4.70 - 6.10 10*6/mm3 Final   • Hemoglobin 10/25/2017 15.2  14.0 - 18.0 g/dL Final   • Hematocrit 10/25/2017 44.5  42.0 - 52.0 % Final   • MCV 10/25/2017 86.1  80.0 - 94.0 fL Final   • MCH 10/25/2017 29.4  27.0 - 33.0 pg Final   • MCHC 10/25/2017 34.2  33.0 - 37.0 g/dL Final   • RDW 10/25/2017 14.9 (H)  11.5 - 14.5 % Final   • RDW-SD 10/25/2017 47.1  37.0 - 54.0 fl Final   • MPV 10/25/2017 9.8  6.0 - 10.0 fL Final   • Platelets 10/25/2017 327  130 - 400 10*3/mm3 Final   • Neutrophil % 10/25/2017 75.8 (H)  30.0 - 70.0 % Final   • Lymphocyte % 10/25/2017 14.6 (L)  21.0 - 51.0 % Final   • Monocyte % 10/25/2017 8.4  0.0 - 10.0 % Final   • Eosinophil % 10/25/2017 1.0  0.0 - 5.0 % Final   • Basophil % 10/25/2017 0.1  0.0 - 2.0 % Final   • Immature Grans % 10/25/2017 0.1  0.0 - 0.5 % Final   • Neutrophils, Absolute 10/25/2017 10.77 (H)  1.40 - 6.50 10*3/mm3 Final   • Lymphocytes, Absolute 10/25/2017 2.08  1.00 - 3.00 10*3/mm3 Final   • Monocytes, Absolute 10/25/2017 1.20 (H)  0.10 - 0.90 10*3/mm3 Final   • Eosinophils, Absolute 10/25/2017 0.14  0.00 - 0.70 10*3/mm3 Final   • Basophils, Absolute 10/25/2017 0.02  0.00 - 0.30 10*3/mm3 Final   • Immature  Grans, Absolute 10/25/2017 0.02  0.00 - 0.03 10*3/mm3 Final   • Osmolality Calc 10/25/2017 278.2  273.0 - 305.0 mOsm/kg Final        Procedure: None  Procedures     Assessment/Plan:   Problem List Items Addressed This Visit        Gastrointestinal Abdominal     Left lower quadrant abdominal pain    Relevant Orders    CT Abdomen Pelvis With & Without Contrast       Genitourinary and Reproductive     Left testicular pain - Primary    Relevant Orders    CT Abdomen Pelvis With & Without Contrast       Health Maintenance:   Health Maintenance Due   Topic Date Due   • COVID-19 Vaccine (1) Never done   • Pneumococcal Vaccine 0-64 (1 - PCV) Never done   • ANNUAL PHYSICAL  Never done        Smoking Counseling: Patient is a current everyday smoker.  Current user smokeless tobacco.  Counseling given however not ready to quit at this time.    Urine Incontinence: Patient reports that he is not currently experiencing any symptoms of urinary incontinence.    Patient was given instructions and counseling regarding his condition or for health maintenance advice. Please see specific information pulled into the AVS if appropriate.    Patient Education:   Left testicular pain -discussed the patient his most recent results that showed no significant abnormalities of the scrotum or testicles other than tiny bilateral hydroceles.  Physical exam today is completely normal.  Did discuss the with the patient conservative treatment with its which can include but are not limited to warm soaks and compresses twice daily for 10 to 15 minutes.  Advised patient to also use good scrotal support with a supportive underwear/jockstrap.  Reassured the patient that there were no current problems at this time.  Left lower abdominal pain -patient has left lower abdominal pain and is concerned for hernia.  States he was informed he had one roughly 2 years ago.  No hernias felt on exam today.  He would like to have a CT for further evaluation.  I will  schedule him appropriately and call with results.    Visit Diagnoses:    ICD-10-CM ICD-9-CM   1. Left testicular pain  N50.812 608.9   2. Left lower quadrant abdominal pain  R10.32 789.04       Meds Ordered During Visit:  No orders of the defined types were placed in this encounter.      Follow Up Appointment: As needed  No follow-ups on file.      This document has been electronically signed by Sotero Mahmood PA-C   April 25, 2023 13:52 EDT    Part of this note may be an electronic transcription/translation of spoken language to printed text using the Dragon Dictation System.

## 2023-05-10 ENCOUNTER — HOSPITAL ENCOUNTER (OUTPATIENT)
Dept: CT IMAGING | Facility: HOSPITAL | Age: 33
Discharge: HOME | End: 2023-05-10
Payer: COMMERCIAL

## 2023-05-10 DIAGNOSIS — N50.812 LEFT TESTICULAR PAIN: ICD-10-CM

## 2023-05-10 DIAGNOSIS — R10.32 LEFT LOWER QUADRANT ABDOMINAL PAIN: ICD-10-CM

## 2023-05-10 PROCEDURE — 74178 CT ABD&PLV WO CNTR FLWD CNTR: CPT | Performed by: RADIOLOGY

## 2023-05-10 PROCEDURE — 25510000001 IOPAMIDOL 61 % SOLUTION

## 2023-05-10 PROCEDURE — 74178 CT ABD&PLV WO CNTR FLWD CNTR: CPT

## 2023-05-10 RX ADMIN — IOPAMIDOL 90 ML: 612 INJECTION, SOLUTION INTRAVENOUS at 09:35

## 2023-05-11 ENCOUNTER — TELEPHONE (OUTPATIENT)
Dept: UROLOGY | Facility: CLINIC | Age: 33
End: 2023-05-11
Payer: COMMERCIAL

## 2023-05-11 NOTE — TELEPHONE ENCOUNTER
----- Message from Sotero Mahmood PA-C sent at 5/11/2023  3:28 PM EDT -----  Please let patient know that his CT scan was unremarkable. Everything came back good.  ----- Message -----  From: Sam[facility] [place] [facility]  Sent: 5/10/2023   9:55 AM EDT  To: Sotero Mahmood PA-C      
Spoke patients mother she will make sure patient is aware of results   
regular

## 2023-11-30 ENCOUNTER — APPOINTMENT (OUTPATIENT)
Dept: ULTRASOUND IMAGING | Facility: HOSPITAL | Age: 33
End: 2023-11-30
Payer: COMMERCIAL

## 2023-11-30 ENCOUNTER — HOSPITAL ENCOUNTER (EMERGENCY)
Facility: HOSPITAL | Age: 33
Discharge: HOME | End: 2023-11-30
Attending: EMERGENCY MEDICINE
Payer: COMMERCIAL

## 2023-11-30 VITALS
OXYGEN SATURATION: 98 % | BODY MASS INDEX: 23.1 KG/M2 | HEIGHT: 74 IN | WEIGHT: 180 LBS | RESPIRATION RATE: 18 BRPM | DIASTOLIC BLOOD PRESSURE: 76 MMHG | SYSTOLIC BLOOD PRESSURE: 130 MMHG | TEMPERATURE: 97.4 F | HEART RATE: 77 BPM

## 2023-11-30 DIAGNOSIS — N43.3 HYDROCELE, UNSPECIFIED HYDROCELE TYPE: Primary | ICD-10-CM

## 2023-11-30 LAB
ALBUMIN SERPL-MCNC: 4.7 G/DL (ref 3.5–5.2)
ALBUMIN/GLOB SERPL: 2 G/DL
ALP SERPL-CCNC: 51 U/L (ref 39–117)
ALT SERPL W P-5'-P-CCNC: 8 U/L (ref 1–41)
ANION GAP SERPL CALCULATED.3IONS-SCNC: 8.6 MMOL/L (ref 5–15)
AST SERPL-CCNC: 12 U/L (ref 1–40)
BASOPHILS # BLD AUTO: 0.02 10*3/MM3 (ref 0–0.2)
BASOPHILS NFR BLD AUTO: 0.3 % (ref 0–1.5)
BILIRUB SERPL-MCNC: 0.3 MG/DL (ref 0–1.2)
BILIRUB UR QL STRIP: NEGATIVE
BUN SERPL-MCNC: 6 MG/DL (ref 6–20)
BUN/CREAT SERPL: 5.5 (ref 7–25)
C TRACH RRNA CVX QL NAA+PROBE: NOT DETECTED
CALCIUM SPEC-SCNC: 9.3 MG/DL (ref 8.6–10.5)
CHLORIDE SERPL-SCNC: 104 MMOL/L (ref 98–107)
CLARITY UR: CLEAR
CO2 SERPL-SCNC: 32.4 MMOL/L (ref 22–29)
COLOR UR: YELLOW
CREAT SERPL-MCNC: 1.1 MG/DL (ref 0.76–1.27)
CRP SERPL-MCNC: <0.3 MG/DL (ref 0–0.5)
DEPRECATED RDW RBC AUTO: 45.7 FL (ref 37–54)
EGFRCR SERPLBLD CKD-EPI 2021: 90.3 ML/MIN/1.73
EOSINOPHIL # BLD AUTO: 0.1 10*3/MM3 (ref 0–0.4)
EOSINOPHIL NFR BLD AUTO: 1.7 % (ref 0.3–6.2)
ERYTHROCYTE [DISTWIDTH] IN BLOOD BY AUTOMATED COUNT: 13.8 % (ref 12.3–15.4)
ERYTHROCYTE [SEDIMENTATION RATE] IN BLOOD: <1 MM/HR (ref 0–15)
GLOBULIN UR ELPH-MCNC: 2.4 GM/DL
GLUCOSE SERPL-MCNC: 108 MG/DL (ref 65–99)
GLUCOSE UR STRIP-MCNC: NEGATIVE MG/DL
HCT VFR BLD AUTO: 39.6 % (ref 37.5–51)
HGB BLD-MCNC: 13.6 G/DL (ref 13–17.7)
HGB UR QL STRIP.AUTO: NEGATIVE
IMM GRANULOCYTES # BLD AUTO: 0.01 10*3/MM3 (ref 0–0.05)
IMM GRANULOCYTES NFR BLD AUTO: 0.2 % (ref 0–0.5)
KETONES UR QL STRIP: NEGATIVE
LEUKOCYTE ESTERASE UR QL STRIP.AUTO: NEGATIVE
LYMPHOCYTES # BLD AUTO: 1.9 10*3/MM3 (ref 0.7–3.1)
LYMPHOCYTES NFR BLD AUTO: 32.9 % (ref 19.6–45.3)
MCH RBC QN AUTO: 30.8 PG (ref 26.6–33)
MCHC RBC AUTO-ENTMCNC: 34.3 G/DL (ref 31.5–35.7)
MCV RBC AUTO: 89.8 FL (ref 79–97)
MONOCYTES # BLD AUTO: 0.6 10*3/MM3 (ref 0.1–0.9)
MONOCYTES NFR BLD AUTO: 10.4 % (ref 5–12)
N GONORRHOEA RRNA SPEC QL NAA+PROBE: NOT DETECTED
NEUTROPHILS NFR BLD AUTO: 3.15 10*3/MM3 (ref 1.7–7)
NEUTROPHILS NFR BLD AUTO: 54.5 % (ref 42.7–76)
NITRITE UR QL STRIP: NEGATIVE
NRBC BLD AUTO-RTO: 0 /100 WBC (ref 0–0.2)
PH UR STRIP.AUTO: 7.5 [PH] (ref 5–8)
PLATELET # BLD AUTO: 215 10*3/MM3 (ref 140–450)
PMV BLD AUTO: 9.9 FL (ref 6–12)
POTASSIUM SERPL-SCNC: 3.8 MMOL/L (ref 3.5–5.2)
PROT SERPL-MCNC: 7.1 G/DL (ref 6–8.5)
PROT UR QL STRIP: NEGATIVE
RBC # BLD AUTO: 4.41 10*6/MM3 (ref 4.14–5.8)
SODIUM SERPL-SCNC: 145 MMOL/L (ref 136–145)
SP GR UR STRIP: 1.01 (ref 1–1.03)
UROBILINOGEN UR QL STRIP: NORMAL
WBC NRBC COR # BLD AUTO: 5.78 10*3/MM3 (ref 3.4–10.8)

## 2023-11-30 PROCEDURE — 85652 RBC SED RATE AUTOMATED: CPT | Performed by: PHYSICIAN ASSISTANT

## 2023-11-30 PROCEDURE — 81003 URINALYSIS AUTO W/O SCOPE: CPT | Performed by: PHYSICIAN ASSISTANT

## 2023-11-30 PROCEDURE — 99284 EMERGENCY DEPT VISIT MOD MDM: CPT

## 2023-11-30 PROCEDURE — 85025 COMPLETE CBC W/AUTO DIFF WBC: CPT | Performed by: PHYSICIAN ASSISTANT

## 2023-11-30 PROCEDURE — 87491 CHLMYD TRACH DNA AMP PROBE: CPT | Performed by: PHYSICIAN ASSISTANT

## 2023-11-30 PROCEDURE — 80053 COMPREHEN METABOLIC PANEL: CPT | Performed by: PHYSICIAN ASSISTANT

## 2023-11-30 PROCEDURE — 76870 US EXAM SCROTUM: CPT | Performed by: RADIOLOGY

## 2023-11-30 PROCEDURE — 76870 US EXAM SCROTUM: CPT

## 2023-11-30 PROCEDURE — 87591 N.GONORRHOEAE DNA AMP PROB: CPT | Performed by: PHYSICIAN ASSISTANT

## 2023-11-30 PROCEDURE — 86140 C-REACTIVE PROTEIN: CPT | Performed by: PHYSICIAN ASSISTANT

## 2023-11-30 RX ORDER — NAPROXEN 500 MG/1
500 TABLET ORAL 2 TIMES DAILY WITH MEALS
Qty: 15 TABLET | Refills: 0 | Status: SHIPPED | OUTPATIENT
Start: 2023-11-30

## 2023-11-30 NOTE — ED PROVIDER NOTES
Subjective   History of Present Illness  33-year-old male who presents to the emergency department with complaint of bilateral testicle pain.  Patient has had aching, throbbing pain.  Patient denies any recent injury or trauma.  Denies any known exposure to STI.    History provided by:  Patient   used: No    Testicle Pain  Location:  Aching, throbbing pain.  Severity:  Moderate  Onset quality:  Gradual  Duration:  2 days  Timing:  Intermittent  Progression:  Worsening  Chronicity:  New  Associated symptoms: no abdominal pain, no chest pain, no congestion, no cough, no diarrhea, no ear pain, no fever, no headaches, no loss of consciousness, no myalgias, no nausea, no rash, no rhinorrhea, no shortness of breath, no sore throat, no vomiting and no wheezing        Review of Systems   Constitutional: Negative.  Negative for appetite change, chills, diaphoresis and fever.   HENT: Negative.  Negative for congestion, ear discharge, ear pain, facial swelling, hearing loss, nosebleeds, rhinorrhea and sore throat.    Eyes: Negative.  Negative for pain, redness and itching.   Respiratory: Negative.  Negative for cough, shortness of breath and wheezing.    Cardiovascular: Negative.  Negative for chest pain and leg swelling.   Gastrointestinal: Negative.  Negative for abdominal pain, constipation, diarrhea, nausea and vomiting.   Endocrine: Negative.  Negative for heat intolerance, polydipsia and polyphagia.   Genitourinary:  Positive for testicular pain. Negative for dysuria, enuresis, flank pain, genital sores and penile discharge.   Musculoskeletal: Negative.  Negative for back pain, gait problem and myalgias.   Skin: Negative.  Negative for rash.   Neurological: Negative.  Negative for seizures, loss of consciousness, speech difficulty, numbness and headaches.   Hematological: Negative.    Psychiatric/Behavioral: Negative.  Negative for behavioral problems, decreased concentration, dysphoric mood,  hallucinations, self-injury and sleep disturbance.    All other systems reviewed and are negative.      Past Medical History:   Diagnosis Date    Anxiety     Depression     Self-injurious behavior     burning injurious behaviors by burning self with Cigarrette in the past 3 months    Substance abuse     Suicidal thoughts     Suicide attempt     about 6 years ago; 60 xanaxs and cut self did not have to seek medical attention     Tumors     lymphoid tumors    Withdrawal symptoms, alcohol     Withdrawal symptoms, drug or narcotic        Allergies   Allergen Reactions    Reglan [Metoclopramide] Other (See Comments)     hypertension       Past Surgical History:   Procedure Laterality Date    HAND SURGERY Left     removed a piece of his muscle       Family History   Problem Relation Age of Onset    Anxiety disorder Mother     Bipolar disorder Mother     Depression Mother     Drug abuse Mother     OCD Mother     Paranoid behavior Mother     Schizophrenia Mother     Alcohol abuse Father     Anxiety disorder Father     Depression Father     Drug abuse Father     OCD Father     Paranoid behavior Father     Alcohol abuse Brother     Alcohol abuse Maternal Uncle     Alcohol abuse Paternal Uncle     Alcohol abuse Maternal Grandfather     Alcohol abuse Paternal Grandfather     Alcohol abuse Paternal Grandmother     ADD / ADHD Neg Hx     Dementia Neg Hx     Seizures Neg Hx     Self-Injurious Behavior  Neg Hx     Suicide Attempts Neg Hx        Social History     Socioeconomic History    Marital status: Legally    Tobacco Use    Smoking status: Every Day     Packs/day: 1.00     Years: 17.00     Additional pack years: 0.00     Total pack years: 17.00     Types: Cigarettes     Start date: 4/1/2000    Smokeless tobacco: Current   Substance and Sexual Activity    Alcohol use: No    Drug use: Yes     Types: Amphetamines, Marijuana    Sexual activity: Yes     Partners: Female           Objective   Physical Exam  Vitals and  nursing note reviewed.   Constitutional:       General: He is not in acute distress.     Appearance: Normal appearance. He is normal weight. He is not ill-appearing or toxic-appearing.   HENT:      Head: Normocephalic and atraumatic.      Right Ear: Tympanic membrane, ear canal and external ear normal.      Left Ear: Tympanic membrane, ear canal and external ear normal.      Nose: Nose normal. No congestion or rhinorrhea.      Mouth/Throat:      Mouth: Mucous membranes are moist. Mucous membranes are dry.      Pharynx: Oropharynx is clear. No oropharyngeal exudate or posterior oropharyngeal erythema.   Eyes:      General: No scleral icterus.        Right eye: No discharge.         Left eye: No discharge.      Extraocular Movements: Extraocular movements intact.      Conjunctiva/sclera: Conjunctivae normal.      Pupils: Pupils are equal, round, and reactive to light.   Cardiovascular:      Rate and Rhythm: Normal rate and regular rhythm.      Pulses: Normal pulses.      Heart sounds: Normal heart sounds. No murmur heard.     No friction rub. No gallop.   Pulmonary:      Effort: Pulmonary effort is normal. No respiratory distress.      Breath sounds: Normal breath sounds. No stridor. No wheezing, rhonchi or rales.   Abdominal:      General: Abdomen is flat. Bowel sounds are normal. There is no distension.      Palpations: Abdomen is soft. There is no mass.      Tenderness: There is no abdominal tenderness. There is no left CVA tenderness, guarding or rebound.      Hernia: No hernia is present.   Genitourinary:     Testes:         Right: Tenderness and swelling present.         Left: Tenderness and swelling present.   Musculoskeletal:         General: No swelling, tenderness, deformity or signs of injury. Normal range of motion.      Cervical back: Normal range of motion and neck supple. No rigidity or tenderness.      Right lower leg: No edema.   Lymphadenopathy:      Cervical: No cervical adenopathy.   Skin:      General: Skin is warm and dry.      Coloration: Skin is not jaundiced or pale.      Findings: No bruising, erythema, lesion or rash.   Neurological:      General: No focal deficit present.      Mental Status: He is alert and oriented to person, place, and time. Mental status is at baseline.      Cranial Nerves: No cranial nerve deficit.      Sensory: No sensory deficit.      Motor: No weakness.      Coordination: Coordination normal.      Gait: Gait normal.      Deep Tendon Reflexes: Reflexes normal.   Psychiatric:         Mood and Affect: Mood normal.         Behavior: Behavior normal.         Thought Content: Thought content normal.         Judgment: Judgment normal.         Procedures           ED Course  ED Course as of 11/30/23 1313   Thu Nov 30, 2023   1309 IMPRESSION:    Tiny hydroceles noted bilaterally.      [BH]   1310 This is a 33-year-old male who comes in with bilateral testicular pain.  Patient had ultrasound to rule out any masses, fluid, testicular torsion.  Ultrasound was negative with exception of tiny hydroceles.  Patient will be given close follow-up with urology.  Also instructed to follow-up with his primary care physician. []      ED Course User Index  [] Marin Carias PA-C                                             Medical Decision Making  Problems Addressed:  Hydrocele, unspecified hydrocele type: complicated acute illness or injury    Amount and/or Complexity of Data Reviewed  Labs: ordered.  Radiology: ordered.    Risk  Prescription drug management.        Final diagnoses:   Hydrocele, unspecified hydrocele type       ED Disposition  ED Disposition       ED Disposition   Discharge    Condition   Stable    Comment   --               Jennifer Fuentes PA  [address]  [address]  [facility] [id number] [phone]    Call in 1 day      Rony Chong MD  [address]  [address]  [place] [id number] [phone]    Call in 1 day           Medication List        New  Prescriptions      naproxen 500 MG EC tablet  Commonly known as: EC NAPROSYN  Take 1 tablet by mouth 2 (Two) Times a Day With Meals.               Where to Get Your Medications        These medications were sent to [facility] - [place], [place] - [address] - [phone]  - [phone]   [address] [facility] [address]      Phone: [phone]   naproxen 500 MG EC tablet            Marin Carias PA-C  11/30/23 1310

## 2023-12-08 ENCOUNTER — OFFICE VISIT (OUTPATIENT)
Dept: CARDIOLOGY | Facility: CLINIC | Age: 33
End: 2023-12-08
Payer: COMMERCIAL

## 2023-12-08 VITALS
HEART RATE: 65 BPM | HEIGHT: 74 IN | WEIGHT: 180.2 LBS | BODY MASS INDEX: 23.13 KG/M2 | DIASTOLIC BLOOD PRESSURE: 63 MMHG | SYSTOLIC BLOOD PRESSURE: 94 MMHG | OXYGEN SATURATION: 98 %

## 2023-12-08 DIAGNOSIS — R07.2 PRECORDIAL PAIN: ICD-10-CM

## 2023-12-08 DIAGNOSIS — R07.89 CHEST PAIN, ATYPICAL: Primary | ICD-10-CM

## 2023-12-08 PROCEDURE — 99202 OFFICE O/P NEW SF 15 MIN: CPT | Performed by: INTERNAL MEDICINE

## 2023-12-08 PROCEDURE — 1159F MED LIST DOCD IN RCRD: CPT | Performed by: INTERNAL MEDICINE

## 2023-12-08 PROCEDURE — 1160F RVW MEDS BY RX/DR IN RCRD: CPT | Performed by: INTERNAL MEDICINE

## 2023-12-08 RX ORDER — RANOLAZINE 500 MG/1
500 TABLET, EXTENDED RELEASE ORAL 2 TIMES DAILY
Qty: 60 TABLET | Refills: 11 | Status: SHIPPED | OUTPATIENT
Start: 2023-12-08

## 2023-12-08 RX ORDER — HALOPERIDOL 1 MG/1
1 TABLET ORAL 2 TIMES DAILY
COMMUNITY

## 2023-12-08 RX ORDER — DIVALPROEX SODIUM 125 MG/1
500 TABLET, DELAYED RELEASE ORAL 2 TIMES DAILY
COMMUNITY

## 2023-12-08 NOTE — PROGRESS NOTES
Office Note    Subjective     Maico Wilcox is a 33 y.o. male who presents to day for chest pain      Active Problems:  Problem List Items Addressed This Visit          Symptoms and Signs    Chest pain, atypical - Primary    Relevant Orders    Lipid Panel    Adult Transthoracic Echo Complete w/ Color, Spectral and Contrast if necessary per protocol    Stress Test With Myocardial Perfusion (1 Day)     Other Visit Diagnoses       Precordial pain                HPI  Patient is a pleasant 33-year-old male who is here for evaluation of chest pain.  Patient has been having discomfort in the chest off and on for a while.  He has some dizziness lightheadedness when he gets up from sitting standing.  Recent ER visit and landed on on November 1 was done also.  He ruled out for MI at that time.  He has dyspnea on exertion.  Has lightheadedness when he gets up.  Blood pressure is on the low side today    PRIOR MEDS  Current Outpatient Medications on File Prior to Visit   Medication Sig Dispense Refill    divalproex (DEPAKOTE) 125 MG DR tablet Take 4 tablets by mouth 2 (Two) Times a Day.      haloperidol (HALDOL) 1 MG tablet Take 1 tablet by mouth 2 (Two) Times a Day.      naproxen (EC NAPROSYN) 500 MG EC tablet Take 1 tablet by mouth 2 (Two) Times a Day With Meals. 15 tablet 0    traZODone (DESYREL) 100 MG tablet Take 1 tablet by mouth every night at bedtime.      risperiDONE (risperDAL) 4 MG tablet Take 1 tablet by mouth Every 12 (Twelve) Hours. (Patient not taking: Reported on 12/8/2023)       No current facility-administered medications on file prior to visit.       ALLERGIES  Reglan [metoclopramide]    HISTORY  Past Medical History:   Diagnosis Date    Anxiety     Depression     Self-injurious behavior     burning injurious behaviors by burning self with Cigar[employer] in the past 3 months    Substance abuse     Suicidal thoughts     Suicide attempt     about 6 years ago; 60 xanaxs and cut self did not have to seek medical  "attention     Tumors     lymphoid tumors    Withdrawal symptoms, alcohol     Withdrawal symptoms, drug or narcotic        Social History     Socioeconomic History    Marital status: Legally    Tobacco Use    Smoking status: Every Day     Packs/day: 1.00     Years: 17.00     Additional pack years: 0.00     Total pack years: 17.00     Types: Cigarettes     Start date: 4/1/2000    Smokeless tobacco: Current   Vaping Use    Vaping Use: Never used   Substance and Sexual Activity    Alcohol use: No    Drug use: Yes     Types: Amphetamines, Marijuana    Sexual activity: Yes     Partners: Female       Family History   Problem Relation Age of Onset    Anxiety disorder Mother     Bipolar disorder Mother     Depression Mother     Drug abuse Mother     OCD Mother     Paranoid behavior Mother     Schizophrenia Mother     Alcohol abuse Father     Anxiety disorder Father     Depression Father     Drug abuse Father     OCD Father     Paranoid behavior Father     Alcohol abuse Brother     Alcohol abuse Maternal Uncle     Alcohol abuse Paternal Uncle     Alcohol abuse Maternal Grandfather     Alcohol abuse Paternal Grandfather     Alcohol abuse Paternal Grandmother     ADD / ADHD Neg Hx     Dementia Neg Hx     Seizures Neg Hx     Self-Injurious Behavior  Neg Hx     Suicide Attempts Neg Hx        Review of Systems   Respiratory:  Positive for chest tightness and shortness of breath.    Cardiovascular:  Positive for chest pain. Negative for palpitations and leg swelling.       Objective     VITALS: BP 94/63 (BP Location: Left arm, Patient Position: Sitting, Cuff Size: Adult)   Pulse 65   Ht 188 cm (74\")   Wt 81.7 kg (180 lb 3.2 oz)   SpO2 98%   BMI 23.14 kg/m²     LABS:   Admission on 11/30/2023, Discharged on 11/30/2023   Component Date Value Ref Range Status    Glucose 11/30/2023 108 (H)  65 - 99 mg/dL Final    BUN 11/30/2023 6  6 - 20 mg/dL Final    Creatinine 11/30/2023 1.10  0.76 - 1.27 mg/dL Final    Sodium " 11/30/2023 145  136 - 145 mmol/L Final    Potassium 11/30/2023 3.8  3.5 - 5.2 mmol/L Final    Chloride 11/30/2023 104  98 - 107 mmol/L Final    CO2 11/30/2023 32.4 (H)  22.0 - 29.0 mmol/L Final    Calcium 11/30/2023 9.3  8.6 - 10.5 mg/dL Final    Total Protein 11/30/2023 7.1  6.0 - 8.5 g/dL Final    Albumin 11/30/2023 4.7  3.5 - 5.2 g/dL Final    ALT (SGPT) 11/30/2023 8  1 - 41 U/L Final    AST (SGOT) 11/30/2023 12  1 - 40 U/L Final    Alkaline Phosphatase 11/30/2023 51  39 - 117 U/L Final    Total Bilirubin 11/30/2023 0.3  0.0 - 1.2 mg/dL Final    Globulin 11/30/2023 2.4  gm/dL Final    A/G Ratio 11/30/2023 2.0  g/dL Final    BUN/Creatinine Ratio 11/30/2023 5.5 (L)  7.0 - 25.0 Final    Anion Gap 11/30/2023 8.6  5.0 - 15.0 mmol/L Final    eGFR 11/30/2023 90.3  >60.0 mL/min/1.73 Final    C-Reactive Protein 11/30/2023 <0.30  0.00 - 0.50 mg/dL Final    Sed Rate 11/30/2023 <1  0 - 15 mm/hr Final    Chlamydia DNA by PCR 11/30/2023 Not Detected  Not Detected Final    Neisseria gonorrhoeae by PCR 11/30/2023 Not Detected  Not Detected Final    Color, UA 11/30/2023 Yellow  Yellow, Straw Final    Appearance, UA 11/30/2023 Clear  Clear Final    pH, UA 11/30/2023 7.5  5.0 - 8.0 Final    Specific [place], UA 11/30/2023 1.006  1.005 - 1.030 Final    Glucose, UA 11/30/2023 Negative  Negative Final    Ketones, UA 11/30/2023 Negative  Negative Final    Bilirubin, UA 11/30/2023 Negative  Negative Final    Blood, UA 11/30/2023 Negative  Negative Final    Protein, UA 11/30/2023 Negative  Negative Final    Leuk Esterase, UA 11/30/2023 Negative  Negative Final    Nitrite, UA 11/30/2023 Negative  Negative Final    Urobilinogen, UA 11/30/2023 0.2 E.U./dL  0.2 - 1.0 E.U./dL Final    WBC 11/30/2023 5.78  3.40 - 10.80 10*3/mm3 Final    RBC 11/30/2023 4.41  4.14 - 5.80 10*6/mm3 Final    Hemoglobin 11/30/2023 13.6  13.0 - 17.7 g/dL Final    Hematocrit 11/30/2023 39.6  37.5 - 51.0 % Final    MCV 11/30/2023 89.8  79.0 - 97.0 fL Final    MCH  11/30/2023 30.8  26.6 - 33.0 pg Final    MCHC 11/30/2023 34.3  31.5 - 35.7 g/dL Final    RDW 11/30/2023 13.8  12.3 - 15.4 % Final    RDW-SD 11/30/2023 45.7  37.0 - 54.0 fl Final    MPV 11/30/2023 9.9  6.0 - 12.0 fL Final    Platelets 11/30/2023 215  140 - 450 10*3/mm3 Final    Neutrophil % 11/30/2023 54.5  42.7 - 76.0 % Final    Lymphocyte % 11/30/2023 32.9  19.6 - 45.3 % Final    Monocyte % 11/30/2023 10.4  5.0 - 12.0 % Final    Eosinophil % 11/30/2023 1.7  0.3 - 6.2 % Final    Basophil % 11/30/2023 0.3  0.0 - 1.5 % Final    Immature Grans % 11/30/2023 0.2  0.0 - 0.5 % Final    Neutrophils, Absolute 11/30/2023 3.15  1.70 - 7.00 10*3/mm3 Final    Lymphocytes, Absolute 11/30/2023 1.90  0.70 - 3.10 10*3/mm3 Final    Monocytes, Absolute 11/30/2023 0.60  0.10 - 0.90 10*3/mm3 Final    Eosinophils, Absolute 11/30/2023 0.10  0.00 - 0.40 10*3/mm3 Final    Basophils, Absolute 11/30/2023 0.02  0.00 - 0.20 10*3/mm3 Final    Immature Grans, Absolute 11/30/2023 0.01  0.00 - 0.05 10*3/mm3 Final    nRBC 11/30/2023 0.0  0.0 - 0.2 /100 WBC Final         IMAGING:   US Scrotum & Testicles    Result Date: 11/30/2023    Tiny hydroceles noted bilaterally.   This report was finalized on 11/30/2023 12:10 PM by Dr. Bryan High MD.      XR spine lumbar 2 or 3 views    Result Date: 11/15/2023  No acute osseous findings. Images personally reviewed, interpreted and dictated by BELEM Johnson.          FL ARTHROGRAM HIP UNILAT W PAIN INJ    Result Date: 11/15/2023  No acute osseous findings. Questionable mild bilateral femoroacetabular impingement. Images personally reviewed, interpreted and dictated by BELEM Johnson.          XR chest AP portable    Result Date: 11/1/2023  No acute process on this portable exam. Images reviewed, interpreted, and dictated by Molly Marques MD    No results found for this or any previous visit.     No results found for this or any previous visit.          EXAM:  Constitutional:       General:  Awake.      Appearance: Healthy appearance. Not in distress.   Eyes:      Conjunctiva/sclera: Conjunctivae normal.   HENT:      Head: Normocephalic and atraumatic.      Nose: Nose normal.    Mouth/Throat:      Pharynx: Oropharynx is clear.   Neck:      Thyroid: Thyroid normal.      Vascular: No carotid bruit or JVD.   Pulmonary:      Effort: Pulmonary effort is normal.      Breath sounds: Normal breath sounds.   Chest:      Chest wall: Not tender to palpatation.   Cardiovascular:      PMI at left midclavicular line. Normal rate. Regular rhythm. Normal S1 with normal intensity. Normal S2 with normal intensity.       Murmurs: There is no murmur.      No gallop.  No rub.   Pulses:     Intact distal pulses.   Edema:     Peripheral edema absent.   Abdominal:      General: Bowel sounds are normal. There is no distension.      Palpations: Abdomen is soft. There is no hepatomegaly.      Tenderness: There is no abdominal tenderness.   Musculoskeletal: Normal range of motion.      Cervical back: Normal range of motion. Skin:     General: Skin is warm and dry. There is no cyanosis.      Coloration: Skin is not jaundiced.   Neurological:      Mental Status: Alert and oriented to person, place and time.      Motor: Motor function is intact.      Gait: Gait is intact.   Psychiatric:         Attention and Perception: Attention and perception normal.         Speech: Speech normal.         Behavior: Behavior is cooperative.         Cognition and Memory: Cognition and memory normal.         Judgment: Judgment normal.          Procedure   Procedures       Assessment & Plan     Diagnoses and all orders for this visit:    1. Chest pain, atypical (Primary)  -     Lipid Panel; Future  -     Adult Transthoracic Echo Complete w/ Color, Spectral and Contrast if necessary per protocol  -     Stress Test With Myocardial Perfusion (1 Day)    2. Precordial pain    Other orders  -     ranolazine (Ranexa) 500 MG 12 hr tablet; Take 1 tablet by  mouth 2 (Two) Times a Day.  Dispense: 60 tablet; Refill: 11          PLAN  #1 cardiac.  Patient with discomfort in the chest.  Will need a stress and echo done.  Will start on Ranexa.  Patient is mildly hypotensive he is on antipsychotic medication which may be contributing to same.  Need to rule out noncardiac causes of chest pain also.  Will watch him closely and clinically.    If abnormal stress, may need cath.           MEDS ORDERED DURING VISIT:    There are no discontinued medications.     New Medications Ordered This Visit   Medications    ranolazine (Ranexa) 500 MG 12 hr tablet     Sig: Take 1 tablet by mouth 2 (Two) Times a Day.     Dispense:  60 tablet     Refill:  11         Follow Up:   No follow-ups on file.    Patient was given instructions and counseling regarding his condition or for health maintenance advice. Please see specific information pulled into the AVS if appropriate.   As always, Jennifer Fuentes PA  I appreciate very much the opportunity to participate in the cardiovascular care of your patients. Please do not hesitate to call me with any questions with regards to Maico Wilcox evaluation and management.         This document has been electronically signed by Miguel A Cates MD [facility], Interventional Cardiology  December 8, 2023 09:09 EST    Dictated Utilizing Dragon Dictation: Part of this note may be an electronic transcription/translation of spoken language to printed text using the Dragon Dictation System.

## 2023-12-14 ENCOUNTER — OFFICE VISIT (OUTPATIENT)
Dept: UROLOGY | Facility: CLINIC | Age: 33
End: 2023-12-14
Payer: COMMERCIAL

## 2023-12-14 VITALS
HEIGHT: 74 IN | HEART RATE: 83 BPM | SYSTOLIC BLOOD PRESSURE: 116 MMHG | DIASTOLIC BLOOD PRESSURE: 67 MMHG | BODY MASS INDEX: 22.92 KG/M2 | WEIGHT: 178.6 LBS

## 2023-12-14 DIAGNOSIS — K92.1 HEMATOCHEZIA: ICD-10-CM

## 2023-12-14 DIAGNOSIS — N50.811 PAIN IN BOTH TESTICLES: Primary | ICD-10-CM

## 2023-12-14 DIAGNOSIS — N50.812 PAIN IN BOTH TESTICLES: Primary | ICD-10-CM

## 2023-12-14 RX ORDER — SULFAMETHOXAZOLE AND TRIMETHOPRIM 800; 160 MG/1; MG/1
1 TABLET ORAL EVERY 12 HOURS
Qty: 20 TABLET | Refills: 0 | Status: SHIPPED | OUTPATIENT
Start: 2023-12-14

## 2023-12-14 NOTE — PROGRESS NOTES
"Chief Complaint:    Chief Complaint   Patient presents with    Left testicular pain       Vital Signs:   /67 (BP Location: Left arm, Patient Position: Sitting, Cuff Size: Adult)   Pulse 83   Ht 188 cm (74.02\")   Wt 81 kg (178 lb 9.6 oz)   BMI 22.92 kg/m²   Body mass index is 22.92 kg/m².      HPI:  Maico Wilcox is a 33 y.o. male who presents today for follow up    History of Present Illness  Mr. Wilcox presents to the clinic for follow-up for left testicular pain.  He does have a past medical history significant for substance abuse.  He has been referred back to us by Jennifer Peña PA-C.  Patient states that roughly 2 weeks ago he began to have bilateral testicular pain and abdominal cramping.  He also reports passage of bright red blood in his stool.  He states he had significant diarrhea as well as mucus from the rectum.  Reports that the bright red blood in stool as well as diarrhea improved however testicular pain remain.  He went to the emergency department on 11/30/2023 for evaluation.  He had a ultrasound of the scrotum completed at that time that showed only bilateral trace hydroceles.  There were no significant signs of epididymitis, or otitis, testicular torsion, testicular masses, or hernias.  Patient states pain has improved but still intermittently occurs.  Physical exam today shows normal testicles bilaterally with no significant hydroceles.  Patient does have slight tenderness to palpation of the left epididymis.  Otherwise unremarkable exam.      Past Medical History:  Past Medical History:   Diagnosis Date    Anxiety     Depression     Self-injurious behavior     burning injurious behaviors by burning self with Cigarrette in the past 3 months    Substance abuse     Suicidal thoughts     Suicide attempt     about 6 years ago; 60 xanaxs and cut self did not have to seek medical attention     Tumors     lymphoid tumors    Withdrawal symptoms, alcohol     Withdrawal symptoms, drug or " narcotic        Current Meds:  Current Outpatient Medications   Medication Sig Dispense Refill    divalproex (DEPAKOTE) 125 MG DR tablet Take 4 tablets by mouth 2 (Two) Times a Day.      haloperidol (HALDOL) 1 MG tablet Take 1 tablet by mouth 2 (Two) Times a Day.      naproxen (EC NAPROSYN) 500 MG EC tablet Take 1 tablet by mouth 2 (Two) Times a Day With Meals. 15 tablet 0    ranolazine (Ranexa) 500 MG 12 hr tablet Take 1 tablet by mouth 2 (Two) Times a Day. 60 tablet 11    traZODone (DESYREL) 100 MG tablet Take 1 tablet by mouth every night at bedtime.      sulfamethoxazole-trimethoprim (Bactrim DS) 800-160 MG per tablet Take 1 tablet by mouth Every 12 (Twelve) Hours. 20 tablet 0     No current facility-administered medications for this visit.        Allergies:   Allergies   Allergen Reactions    Reglan [Metoclopramide] Other (See Comments)     hypertension        Past Surgical History:  Past Surgical History:   Procedure Laterality Date    HAND SURGERY Left     removed a piece of his muscle       Social History:  Social History     Socioeconomic History    Marital status: Legally    Tobacco Use    Smoking status: Every Day     Packs/day: 1.00     Years: 17.00     Additional pack years: 0.00     Total pack years: 17.00     Types: Cigarettes     Start date: 4/1/2000    Smokeless tobacco: Current   Vaping Use    Vaping Use: Never used   Substance and Sexual Activity    Alcohol use: No    Drug use: Yes     Types: Amphetamines, Marijuana    Sexual activity: Yes     Partners: Female       Family History:  Family History   Problem Relation Age of Onset    Anxiety disorder Mother     Bipolar disorder Mother     Depression Mother     Drug abuse Mother     OCD Mother     Paranoid behavior Mother     Schizophrenia Mother     Alcohol abuse Father     Anxiety disorder Father     Depression Father     Drug abuse Father     OCD Father     Paranoid behavior Father     Alcohol abuse Brother     Alcohol abuse Maternal  Uncle     Alcohol abuse Paternal Uncle     Alcohol abuse Maternal Grandfather     Alcohol abuse Paternal Grandfather     Alcohol abuse Paternal Grandmother     ADD / ADHD Neg Hx     Dementia Neg Hx     Seizures Neg Hx     Self-Injurious Behavior  Neg Hx     Suicide Attempts Neg Hx        Review of Systems:  Review of Systems   Constitutional:  Positive for fatigue. Negative for chills, fever and unexpected weight change.   HENT:  Negative for congestion and sinus pressure.    Respiratory:  Negative for chest tightness and shortness of breath.    Cardiovascular:  Negative for chest pain.   Gastrointestinal:  Negative for abdominal pain, constipation, diarrhea, nausea and vomiting.   Genitourinary:  Positive for testicular pain. Negative for difficulty urinating, dysuria, flank pain, frequency, hematuria, scrotal swelling and urgency.   Musculoskeletal:  Negative for back pain and neck pain.   Skin:  Negative for rash.   Neurological:  Negative for dizziness and headaches.   Hematological:  Does not bruise/bleed easily.   Psychiatric/Behavioral:  Negative for confusion and suicidal ideas. The patient is not nervous/anxious.        Physical Exam:  Physical Exam  Constitutional:       General: He is not in acute distress.     Appearance: Normal appearance.   HENT:      Head: Normocephalic.      Mouth/Throat:      Mouth: Mucous membranes are moist.      Pharynx: Oropharynx is clear.   Eyes:      Extraocular Movements: Extraocular movements intact.      Conjunctiva/sclera: Conjunctivae normal.   Cardiovascular:      Rate and Rhythm: Normal rate and regular rhythm.      Pulses: Normal pulses.      Heart sounds: Normal heart sounds.   Pulmonary:      Effort: Pulmonary effort is normal.      Breath sounds: Normal breath sounds.   Abdominal:      General: Abdomen is flat. Bowel sounds are normal.      Palpations: Abdomen is soft.   Genitourinary:     Rectum: Normal.   Musculoskeletal:      Cervical back: Normal range of  motion.   Skin:     General: Skin is warm.   Neurological:      General: No focal deficit present.      Mental Status: He is alert and oriented to person, place, and time.   Psychiatric:         Mood and Affect: Mood normal.         Thought Content: Thought content normal.         Judgment: Judgment normal.         Recent Image (CT and/or KUB):   CT Abdomen and Pelvis: Results for orders placed during the hospital encounter of 05/10/23    CT Abdomen Pelvis With & Without Contrast    Narrative  EXAM:  CT Abdomen and Pelvis Without and With Intravenous Contrast    EXAM DATE:  5/10/2023 9:15 AM    CLINICAL HISTORY:  Left abdominal/testicular pain; N50.812-Left testicular pain;  R10.32-Left lower quadrant pain    TECHNIQUE:  Axial computed tomography images of the abdomen and pelvis without and  with intravenous contrast.  Sagittal and coronal reformatted images were  created and reviewed.  This CT exam was performed using one or more of  the following dose reduction techniques:  automated exposure control,  adjustment of the mA and/or kV according to patient size, and/or use of  iterative reconstruction technique.    COMPARISON:  No relevant prior studies available.    FINDINGS:  LUNG BASES:  Unremarkable.  No mass.  No consolidation.    ABDOMEN:  LIVER:  Unremarkable.  No mass.  GALLBLADDER AND BILE DUCTS:  Unremarkable.  No calcified stones.  No  ductal dilation.  PANCREAS:  Unremarkable.  No mass.  No ductal dilation.  SPLEEN:  Unremarkable.  No splenomegaly.  ADRENALS:  Unremarkable.  No mass.  KIDNEYS AND URETERS:  Unremarkable.  No solid mass.  No obstructing  stones.  No hydronephrosis.  STOMACH AND BOWEL:  Unremarkable.  No obstruction.  No mucosal  thickening.    PELVIS:  APPENDIX:  No findings to suggest acute appendicitis.  BLADDER:  Unremarkable.  No mass.  No stones.  REPRODUCTIVE:  Unremarkable as visualized.    ABDOMEN and PELVIS:  INTRAPERITONEAL SPACE:  Unremarkable.  No free air.  No  significant  fluid collection.  BONES/JOINTS:  No acute fracture.  No dislocation.  SOFT TISSUES:  Unremarkable.  VASCULATURE:  Unremarkable.  No abdominal aortic aneurysm.  LYMPH NODES:  Unremarkable.  No enlarged lymph nodes.    Impression  No acute findings in the abdomen or pelvis.    This report was finalized on 5/10/2023 9:53 AM by Dr. Bryan High MD.     CT Stone Protocol: No results found for this or any previous visit.     KUB: No results found for this or any previous visit.       Labs:  Brief Urine Lab Results  (Last result in the past 365 days)        Color   Clarity   Blood   Leuk Est   Nitrite   Protein   CREAT   Urine HCG        11/30/23 1219 Yellow   Clear   Negative   Negative   Negative   Negative                 Admission on 11/30/2023, Discharged on 11/30/2023   Component Date Value Ref Range Status    Glucose 11/30/2023 108 (H)  65 - 99 mg/dL Final    BUN 11/30/2023 6  6 - 20 mg/dL Final    Creatinine 11/30/2023 1.10  0.76 - 1.27 mg/dL Final    Sodium 11/30/2023 145  136 - 145 mmol/L Final    Potassium 11/30/2023 3.8  3.5 - 5.2 mmol/L Final    Chloride 11/30/2023 104  98 - 107 mmol/L Final    CO2 11/30/2023 32.4 (H)  22.0 - 29.0 mmol/L Final    Calcium 11/30/2023 9.3  8.6 - 10.5 mg/dL Final    Total Protein 11/30/2023 7.1  6.0 - 8.5 g/dL Final    Albumin 11/30/2023 4.7  3.5 - 5.2 g/dL Final    ALT (SGPT) 11/30/2023 8  1 - 41 U/L Final    AST (SGOT) 11/30/2023 12  1 - 40 U/L Final    Alkaline Phosphatase 11/30/2023 51  39 - 117 U/L Final    Total Bilirubin 11/30/2023 0.3  0.0 - 1.2 mg/dL Final    Globulin 11/30/2023 2.4  gm/dL Final    A/G Ratio 11/30/2023 2.0  g/dL Final    BUN/Creatinine Ratio 11/30/2023 5.5 (L)  7.0 - 25.0 Final    Anion Gap 11/30/2023 8.6  5.0 - 15.0 mmol/L Final    eGFR 11/30/2023 90.3  >60.0 mL/min/1.73 Final    C-Reactive Protein 11/30/2023 <0.30  0.00 - 0.50 mg/dL Final    Sed Rate 11/30/2023 <1  0 - 15 mm/hr Final    Chlamydia DNA by PCR 11/30/2023 Not Detected  Not  Detected Final    Neisseria gonorrhoeae by PCR 11/30/2023 Not Detected  Not Detected Final    Color, UA 11/30/2023 Yellow  Yellow, Straw Final    Appearance, UA 11/30/2023 Clear  Clear Final    pH, UA 11/30/2023 7.5  5.0 - 8.0 Final    Specific [place], UA 11/30/2023 1.006  1.005 - 1.030 Final    Glucose, UA 11/30/2023 Negative  Negative Final    Ketones, UA 11/30/2023 Negative  Negative Final    Bilirubin, UA 11/30/2023 Negative  Negative Final    Blood, UA 11/30/2023 Negative  Negative Final    Protein, UA 11/30/2023 Negative  Negative Final    Leuk Esterase, UA 11/30/2023 Negative  Negative Final    Nitrite, UA 11/30/2023 Negative  Negative Final    Urobilinogen, UA 11/30/2023 0.2 E.U./dL  0.2 - 1.0 E.U./dL Final    WBC 11/30/2023 5.78  3.40 - 10.80 10*3/mm3 Final    RBC 11/30/2023 4.41  4.14 - 5.80 10*6/mm3 Final    Hemoglobin 11/30/2023 13.6  13.0 - 17.7 g/dL Final    Hematocrit 11/30/2023 39.6  37.5 - 51.0 % Final    MCV 11/30/2023 89.8  79.0 - 97.0 fL Final    MCH 11/30/2023 30.8  26.6 - 33.0 pg Final    MCHC 11/30/2023 34.3  31.5 - 35.7 g/dL Final    RDW 11/30/2023 13.8  12.3 - 15.4 % Final    RDW-SD 11/30/2023 45.7  37.0 - 54.0 fl Final    MPV 11/30/2023 9.9  6.0 - 12.0 fL Final    Platelets 11/30/2023 215  140 - 450 10*3/mm3 Final    Neutrophil % 11/30/2023 54.5  42.7 - 76.0 % Final    Lymphocyte % 11/30/2023 32.9  19.6 - 45.3 % Final    Monocyte % 11/30/2023 10.4  5.0 - 12.0 % Final    Eosinophil % 11/30/2023 1.7  0.3 - 6.2 % Final    Basophil % 11/30/2023 0.3  0.0 - 1.5 % Final    Immature Grans % 11/30/2023 0.2  0.0 - 0.5 % Final    Neutrophils, Absolute 11/30/2023 3.15  1.70 - 7.00 10*3/mm3 Final    Lymphocytes, Absolute 11/30/2023 1.90  0.70 - 3.10 10*3/mm3 Final    Monocytes, Absolute 11/30/2023 0.60  0.10 - 0.90 10*3/mm3 Final    Eosinophils, Absolute 11/30/2023 0.10  0.00 - 0.40 10*3/mm3 Final    Basophils, Absolute 11/30/2023 0.02  0.00 - 0.20 10*3/mm3 Final    Immature Grans, Absolute 11/30/2023  0.01  0.00 - 0.05 10*3/mm3 Final    nRBC 11/30/2023 0.0  0.0 - 0.2 /100 WBC Final        Procedure: None  Procedures     I have reviewed and agree with the above PMH, PSH, FMH, social history, medications, allergies, and labs.     Assessment/Plan:   Problem List Items Addressed This Visit          Genitourinary and Reproductive     Left testicular pain - Primary    Relevant Medications    sulfamethoxazole-trimethoprim (Bactrim DS) 800-160 MG per tablet     Other Visit Diagnoses       Hematochezia        Relevant Orders    Ambulatory Referral to Gastroenterology            Health Maintenance:   Health Maintenance Due   Topic Date Due    COVID-19 Vaccine (1) Never done    Pneumococcal Vaccine 0-64 (1 - PCV) Never done    ANNUAL PHYSICAL  Never done    INFLUENZA VACCINE  Never done        Smoking Counseling: Everyday smoker.  Current user of smokeless tobacco.  Counseling given however not ready to quit at this time.    Urine Incontinence: Patient reports that he is not currently experiencing any symptoms of urinary incontinence.    Patient was given instructions and counseling regarding his condition or for health maintenance advice. Please see specific information pulled into the AVS if appropriate.    Patient Education:   Epididymitis-we discussed the etiology of epididymitis from lifting heavy objects to the full spectrum of epididymoorchitis.  I discussed the staging and grading system including a stage I epididymitis meaning confined to the epididymis but in a separation between the epididymis and testicle grade 2 being a concretion of both layers were I can feel the difference and finally grade 3 with scrotal fixation of the skin we discussed the recommendation of warm soaks, elevation and antibiotics were indicated.  I discussed the indication for aggressive intervention such as the presence of an abscess in the presence of significant systemic symptomatology such as fevers and chills.  We also discussed the  fact that the thickening and swelling may persist beyond the pain and that sometimes a prolonged course of antibiotics may be indicated finally we discussed ultimately there may be significant atrophy of the testicle after the episode of epididymitis and its important to watch closely for that pain.  Given patient's intermittent dull ache and testicular pain we will give him a 10-day course of Bactrim to take 1 tablet every 12 hours.  Discussed the risk and benefits of this medication with the patient.  Advised patient to discontinue medication if he begins to experience worsening of GI side effects or any hives, nausea, vomiting, shortness of breath, or rash.  Advised him to use warm soaks and compresses twice daily with good scrotal support.  Hematochezia -patient does complain of bright red blood from rectum that has resolved.  He would like a referral to GI for possible colonoscopy.    Visit Diagnoses:    ICD-10-CM ICD-9-CM   1. Pain in both testicles  N50.811 608.9    N50.812    2. Hematochezia  K92.1 578.1       Meds Ordered During Visit:  New Medications Ordered This Visit   Medications    sulfamethoxazole-trimethoprim (Bactrim DS) 800-160 MG per tablet     Sig: Take 1 tablet by mouth Every 12 (Twelve) Hours.     Dispense:  20 tablet     Refill:  0       Follow Up Appointment: As needed  No follow-ups on file.      This document has been electronically signed by Sotero Mahmood PA-C   December 14, 2023 18:08 EST    Part of this note may be an electronic transcription/translation of spoken language to printed text using the Dragon Dictation System.

## 2024-01-26 ENCOUNTER — HOSPITAL ENCOUNTER (OUTPATIENT)
Dept: NUCLEAR MEDICINE | Facility: HOSPITAL | Age: 34
Discharge: HOME | End: 2024-01-26
Payer: COMMERCIAL

## 2024-01-26 ENCOUNTER — HOSPITAL ENCOUNTER (OUTPATIENT)
Dept: CARDIOLOGY | Facility: HOSPITAL | Age: 34
Discharge: HOME | End: 2024-01-26
Payer: COMMERCIAL

## 2024-01-26 PROCEDURE — 78452 HT MUSCLE IMAGE SPECT MULT: CPT

## 2024-01-26 PROCEDURE — 0 TECHNETIUM SESTAMIBI: Performed by: INTERNAL MEDICINE

## 2024-01-26 PROCEDURE — A9500 TC99M SESTAMIBI: HCPCS | Performed by: INTERNAL MEDICINE

## 2024-01-26 PROCEDURE — 93017 CV STRESS TEST TRACING ONLY: CPT

## 2024-01-26 PROCEDURE — 93306 TTE W/DOPPLER COMPLETE: CPT

## 2024-01-26 RX ADMIN — TECHNETIUM TC 99M SESTAMIBI 1 DOSE: 1 INJECTION INTRAVENOUS at 10:40

## 2024-01-26 RX ADMIN — TECHNETIUM TC 99M SESTAMIBI 1 DOSE: 1 INJECTION INTRAVENOUS at 09:30

## 2024-01-29 LAB
BH CV ECHO MEAS - ACS: 2 CM
BH CV ECHO MEAS - AO MAX PG: 4.2 MMHG
BH CV ECHO MEAS - AO MEAN PG: 3 MMHG
BH CV ECHO MEAS - AO ROOT DIAM: 3.1 CM
BH CV ECHO MEAS - AO V2 MAX: 103 CM/SEC
BH CV ECHO MEAS - AO V2 VTI: 23.6 CM
BH CV ECHO MEAS - EDV(CUBED): 107.2 ML
BH CV ECHO MEAS - EDV(MOD-SP4): 61.1 ML
BH CV ECHO MEAS - EF(MOD-BP): 51 %
BH CV ECHO MEAS - EF(MOD-SP4): 51.2 %
BH CV ECHO MEAS - ESV(CUBED): 39.3 ML
BH CV ECHO MEAS - ESV(MOD-SP4): 29.8 ML
BH CV ECHO MEAS - FS: 28.4 %
BH CV ECHO MEAS - IVS/LVPW: 0.88 CM
BH CV ECHO MEAS - IVSD: 0.75 CM
BH CV ECHO MEAS - LA DIMENSION: 3.4 CM
BH CV ECHO MEAS - LAT PEAK E' VEL: 13.1 CM/SEC
BH CV ECHO MEAS - LV DIASTOLIC VOL/BSA (35-75): 29.5 CM2
BH CV ECHO MEAS - LV MASS(C)D: 124.5 GRAMS
BH CV ECHO MEAS - LV SYSTOLIC VOL/BSA (12-30): 14.4 CM2
BH CV ECHO MEAS - LVIDD: 4.8 CM
BH CV ECHO MEAS - LVIDS: 3.4 CM
BH CV ECHO MEAS - LVOT AREA: 3.8 CM2
BH CV ECHO MEAS - LVOT DIAM: 2.2 CM
BH CV ECHO MEAS - LVPWD: 0.85 CM
BH CV ECHO MEAS - MED PEAK E' VEL: 10.8 CM/SEC
BH CV ECHO MEAS - MV A MAX VEL: 44 CM/SEC
BH CV ECHO MEAS - MV E MAX VEL: 69.7 CM/SEC
BH CV ECHO MEAS - MV E/A: 1.58
BH CV ECHO MEAS - PA ACC TIME: 0.14 SEC
BH CV ECHO MEAS - SI(MOD-SP4): 15.1 ML/M2
BH CV ECHO MEAS - SV(MOD-SP4): 31.3 ML
BH CV ECHO MEAS - TAPSE (>1.6): 1.96 CM
BH CV ECHO MEASUREMENTS AVERAGE E/E' RATIO: 5.83
BH CV NUCLEAR PRIOR STUDY: 3
BH CV REST NUCLEAR ISOTOPE DOSE: 10.2 MCI
BH CV STRESS BP STAGE 1: NORMAL
BH CV STRESS BP STAGE 2: NORMAL
BH CV STRESS BP STAGE 3: NORMAL
BH CV STRESS DURATION MIN STAGE 1: 3
BH CV STRESS DURATION MIN STAGE 2: 3
BH CV STRESS DURATION MIN STAGE 3: 3
BH CV STRESS DURATION MIN STAGE 4: 1
BH CV STRESS DURATION SEC STAGE 1: 0
BH CV STRESS DURATION SEC STAGE 2: 0
BH CV STRESS DURATION SEC STAGE 3: 0
BH CV STRESS DURATION SEC STAGE 4: 4
BH CV STRESS GRADE STAGE 1: 10
BH CV STRESS GRADE STAGE 2: 12
BH CV STRESS GRADE STAGE 3: 14
BH CV STRESS GRADE STAGE 4: 16
BH CV STRESS HR STAGE 1: 93
BH CV STRESS HR STAGE 2: 110
BH CV STRESS HR STAGE 3: 151
BH CV STRESS HR STAGE 4: 160
BH CV STRESS METS STAGE 1: 5
BH CV STRESS METS STAGE 2: 7.5
BH CV STRESS METS STAGE 3: 10
BH CV STRESS METS STAGE 4: 13.5
BH CV STRESS NUCLEAR ISOTOPE DOSE: 32.1 MCI
BH CV STRESS PROTOCOL 1: NORMAL
BH CV STRESS RECOVERY BP: NORMAL MMHG
BH CV STRESS RECOVERY HR: 84 BPM
BH CV STRESS SPEED STAGE 1: 1.7
BH CV STRESS SPEED STAGE 2: 2.5
BH CV STRESS SPEED STAGE 3: 3.4
BH CV STRESS SPEED STAGE 4: 4.2
BH CV STRESS STAGE 1: 1
BH CV STRESS STAGE 2: 2
BH CV STRESS STAGE 3: 3
BH CV STRESS STAGE 4: 4
LEFT ATRIUM VOLUME INDEX: 16.2 ML/M2
LV EF NUC BP: 63 %
MAXIMAL PREDICTED HEART RATE: 186 BPM
PERCENT MAX PREDICTED HR: 86.02 %
STRESS BASELINE BP: NORMAL MMHG
STRESS BASELINE HR: 65 BPM
STRESS PERCENT HR: 101 %
STRESS POST ESTIMATED WORKLOAD: 13.4 METS
STRESS POST EXERCISE DUR MIN: 10 MIN
STRESS POST EXERCISE DUR SEC: 2 SEC
STRESS POST PEAK BP: NORMAL MMHG
STRESS POST PEAK HR: 160 BPM
STRESS TARGET HR: 158 BPM

## 2024-01-30 ENCOUNTER — OFFICE VISIT (OUTPATIENT)
Dept: CARDIOLOGY | Facility: CLINIC | Age: 34
End: 2024-01-30
Payer: COMMERCIAL

## 2024-01-30 VITALS
BODY MASS INDEX: 22.59 KG/M2 | HEIGHT: 74 IN | WEIGHT: 176 LBS | SYSTOLIC BLOOD PRESSURE: 109 MMHG | HEART RATE: 56 BPM | DIASTOLIC BLOOD PRESSURE: 74 MMHG

## 2024-01-30 DIAGNOSIS — I20.89 OTHER FORMS OF ANGINA PECTORIS: ICD-10-CM

## 2024-01-30 DIAGNOSIS — R00.2 PALPITATIONS: Primary | ICD-10-CM

## 2024-01-30 PROCEDURE — 99212 OFFICE O/P EST SF 10 MIN: CPT | Performed by: INTERNAL MEDICINE

## 2024-01-30 PROCEDURE — 1160F RVW MEDS BY RX/DR IN RCRD: CPT | Performed by: INTERNAL MEDICINE

## 2024-01-30 PROCEDURE — 1159F MED LIST DOCD IN RCRD: CPT | Performed by: INTERNAL MEDICINE

## 2024-01-30 NOTE — PROGRESS NOTES
Office Note    Subjective     Maico Wilcox is a 33 y.o. male who presents to day for follow-up      Active Problems:  Problem List Items Addressed This Visit          Cardiac and Vasculature    Palpitations - Primary    Relevant Orders    Cardiac Event Monitor    Other forms of angina pectoris       HPI  Patient is a pleasant 33-year-old gentleman with past history of chest pains.  Patient underwent a stress test on January 29, 2023 which was negative for ischemia.  EF was preserved.  Echocardiogram showed normal structure of the heart.  Normal valves.  Occasionally gets some mild dizziness lightheadedness.  Occasional palpitations.  Has had history of syncope also.  Sometimes he gets anxious while filling out any paperwork.  His blood pressure on arrival in clinic was 87/55 but repeat check was 109/75.    PRIOR MEDS  Current Outpatient Medications on File Prior to Visit   Medication Sig Dispense Refill    divalproex (DEPAKOTE) 125 MG DR tablet Take 4 tablets by mouth 2 (Two) Times a Day.      haloperidol (HALDOL) 1 MG tablet Take 1 tablet by mouth 2 (Two) Times a Day.      naproxen (EC NAPROSYN) 500 MG EC tablet Take 1 tablet by mouth 2 (Two) Times a Day With Meals. 15 tablet 0    ranolazine (Ranexa) 500 MG 12 hr tablet Take 1 tablet by mouth 2 (Two) Times a Day. 60 tablet 11    sulfamethoxazole-trimethoprim (Bactrim DS) 800-160 MG per tablet Take 1 tablet by mouth Every 12 (Twelve) Hours. 20 tablet 0    traZODone (DESYREL) 100 MG tablet Take 1 tablet by mouth every night at bedtime.       No current facility-administered medications on file prior to visit.       ALLERGIES  Reglan [metoclopramide]    HISTORY  Past Medical History:   Diagnosis Date    Anxiety     Depression     Self-injurious behavior     burning injurious behaviors by burning self with Cigarrette in the past 3 months    Substance abuse     Suicidal thoughts     Suicide attempt     about 6 years ago; 60 xanaxs and cut self did not have to seek  "medical attention     Tumors     lymphoid tumors    Withdrawal symptoms, alcohol     Withdrawal symptoms, drug or narcotic        Social History     Socioeconomic History    Marital status: Legally    Tobacco Use    Smoking status: Every Day     Packs/day: 1.00     Years: 17.00     Additional pack years: 0.00     Total pack years: 17.00     Types: Cigarettes     Start date: 4/1/2000    Smokeless tobacco: Current   Vaping Use    Vaping Use: Never used   Substance and Sexual Activity    Alcohol use: No    Drug use: Yes     Types: Amphetamines, Marijuana    Sexual activity: Yes     Partners: Female       Family History   Problem Relation Age of Onset    Anxiety disorder Mother     Bipolar disorder Mother     Depression Mother     Drug abuse Mother     OCD Mother     Paranoid behavior Mother     Schizophrenia Mother     Alcohol abuse Father     Anxiety disorder Father     Depression Father     Drug abuse Father     OCD Father     Paranoid behavior Father     Alcohol abuse Brother     Alcohol abuse Maternal Uncle     Alcohol abuse Paternal Uncle     Alcohol abuse Maternal Grandfather     Alcohol abuse Paternal Grandfather     Alcohol abuse Paternal Grandmother     ADD / ADHD Neg Hx     Dementia Neg Hx     Seizures Neg Hx     Self-Injurious Behavior  Neg Hx     Suicide Attempts Neg Hx        Review of Systems   Respiratory:  Negative for chest tightness and shortness of breath.    Cardiovascular:  Positive for palpitations. Negative for chest pain and leg swelling.   Neurological:  Positive for dizziness and light-headedness.   Psychiatric/Behavioral:  Negative for hallucinations.        Objective     VITALS: /74   Pulse 56   Ht 188 cm (74\")   Wt 79.8 kg (176 lb)   BMI 22.60 kg/m²     LABS:   Office Visit on 12/08/2023   Component Date Value Ref Range Status    LVIDd 01/26/2024 4.8  cm Final    LVIDs 01/26/2024 3.4  cm Final    IVSd 01/26/2024 0.75  cm Final    LVPWd 01/26/2024 0.85  cm Final    FS " 01/26/2024 28.4  % Final    IVS/LVPW 01/26/2024 0.88  cm Final    ESV(cubed) 01/26/2024 39.3  ml Final    LV Sys Vol (BSA corrected) 01/26/2024 14.4  cm2 Final    EDV(cubed) 01/26/2024 107.2  ml Final    LV Paulino Vol (BSA corrected) 01/26/2024 29.5  cm2 Final    LV mass(C)d 01/26/2024 124.5  grams Final    LVOT area 01/26/2024 3.8  cm2 Final    LVOT diam 01/26/2024 2.20  cm Final    EDV(MOD-sp4) 01/26/2024 61.1  ml Final    ESV(MOD-sp4) 01/26/2024 29.8  ml Final    SV(MOD-sp4) 01/26/2024 31.3  ml Final    SI(MOD-sp4) 01/26/2024 15.1  ml/m2 Final    EF(MOD-sp4) 01/26/2024 51.2  % Final    MV E max ender 01/26/2024 69.7  cm/sec Final    MV A max ender 01/26/2024 44.0  cm/sec Final    MV E/A 01/26/2024 1.58   Final    LA ESV Index (BP) 01/26/2024 16.2  ml/m2 Final    Med Peak E' Ender 01/26/2024 10.8  cm/sec Final    Lat Peak E' Ender 01/26/2024 13.1  cm/sec Final    Avg E/e' ratio 01/26/2024 5.83   Final    TAPSE (>1.6) 01/26/2024 1.96  cm Final    LA dimension (2D)  01/26/2024 3.4  cm Final    Ao pk ender 01/26/2024 103.0  cm/sec Final    Ao max PG 01/26/2024 4.2  mmHg Final    Ao mean PG 01/26/2024 3.0  mmHg Final    Ao V2 VTI 01/26/2024 23.6  cm Final    PA acc time 01/26/2024 0.14  sec Final    Ao root diam 01/26/2024 3.1  cm Final    ACS 01/26/2024 2.00  cm Final    EF(MOD-bp) 01/26/2024 51.0  % Final    Nuclear Prior Study 01/26/2024 3.0   Final     CV REST NUCLEAR ISOTOPE DOSE 01/26/2024 10.2  mCi Final     CV STRESS NUCLEAR ISOTOPE DOSE 01/26/2024 32.1  mCi Final    Exercise duration (min) 01/26/2024 10  min Final    Exercise duration (sec) 01/26/2024 2  sec Final    Estimated workload 01/26/2024 13.4  METS Final     CV STRESS PROTOCOL 1 01/26/2024 Wilfrido   Final    Stage 1 01/26/2024 1.0   Final    HR Stage 1 01/26/2024 93   Final    BP Stage 1 01/26/2024 131/64   Final    Duration Min Stage 1 01/26/2024 3   Final    Duration Sec Stage 1 01/26/2024 0   Final    Grade Stage 1 01/26/2024 10   Final    Speed Stage 1  01/26/2024 1.7   Final    BH CV STRESS METS STAGE 1 01/26/2024 5.0   Final    Stage 2 01/26/2024 2.0   Final    HR Stage 2 01/26/2024 110   Final    BP Stage 2 01/26/2024 111/70   Final    Duration Min Stage 2 01/26/2024 3   Final    Duration Sec Stage 2 01/26/2024 0   Final    Grade Stage 2 01/26/2024 12   Final    Speed Stage 2 01/26/2024 2.5   Final    BH CV STRESS METS STAGE 2 01/26/2024 7.5   Final    Stage 3 01/26/2024 3.0   Final    HR Stage 3 01/26/2024 151   Final    BP Stage 3 01/26/2024 151/77   Final    Duration Min Stage 3 01/26/2024 3   Final    Duration Sec Stage 3 01/26/2024 0   Final    Grade Stage 3 01/26/2024 14   Final    Speed Stage 3 01/26/2024 3.4   Final    BH CV STRESS METS STAGE 3 01/26/2024 10.0   Final    Stage 4 01/26/2024 4.0   Final    HR Stage 4 01/26/2024 160   Final    Duration Min Stage 4 01/26/2024 1   Final    Duration Sec Stage 4 01/26/2024 4   Final    Grade Stage 4 01/26/2024 16   Final    Speed Stage 4 01/26/2024 4.2   Final    BH CV STRESS METS STAGE 4 01/26/2024 13.5   Final    Baseline HR 01/26/2024 65  bpm Final    Baseline BP 01/26/2024 108/68  mmHg Final    Peak HR 01/26/2024 160  bpm Final    Peak BP 01/26/2024 151/77  mmHg Final    Recovery HR 01/26/2024 84  bpm Final    Recovery BP 01/26/2024 142/62  mmHg Final    Target HR (85%) 01/26/2024 158  bpm Final    Max. Pred. HR (100%) 01/26/2024 186  bpm Final    Percent Max Pred HR 01/26/2024 86.02  % Final    Percent Target HR 01/26/2024 101  % Final    Nuc Stress EF 01/26/2024 63  % Final   Admission on 11/30/2023, Discharged on 11/30/2023   Component Date Value Ref Range Status    Glucose 11/30/2023 108 (H)  65 - 99 mg/dL Final    BUN 11/30/2023 6  6 - 20 mg/dL Final    Creatinine 11/30/2023 1.10  0.76 - 1.27 mg/dL Final    Sodium 11/30/2023 145  136 - 145 mmol/L Final    Potassium 11/30/2023 3.8  3.5 - 5.2 mmol/L Final    Chloride 11/30/2023 104  98 - 107 mmol/L Final    CO2 11/30/2023 32.4 (H)  22.0 - 29.0 mmol/L  Final    Calcium 11/30/2023 9.3  8.6 - 10.5 mg/dL Final    Total Protein 11/30/2023 7.1  6.0 - 8.5 g/dL Final    Albumin 11/30/2023 4.7  3.5 - 5.2 g/dL Final    ALT (SGPT) 11/30/2023 8  1 - 41 U/L Final    AST (SGOT) 11/30/2023 12  1 - 40 U/L Final    Alkaline Phosphatase 11/30/2023 51  39 - 117 U/L Final    Total Bilirubin 11/30/2023 0.3  0.0 - 1.2 mg/dL Final    Globulin 11/30/2023 2.4  gm/dL Final    A/G Ratio 11/30/2023 2.0  g/dL Final    BUN/Creatinine Ratio 11/30/2023 5.5 (L)  7.0 - 25.0 Final    Anion Gap 11/30/2023 8.6  5.0 - 15.0 mmol/L Final    eGFR 11/30/2023 90.3  >60.0 mL/min/1.73 Final    C-Reactive Protein 11/30/2023 <0.30  0.00 - 0.50 mg/dL Final    Sed Rate 11/30/2023 <1  0 - 15 mm/hr Final    Chlamydia DNA by PCR 11/30/2023 Not Detected  Not Detected Final    Neisseria gonorrhoeae by PCR 11/30/2023 Not Detected  Not Detected Final    Color, UA 11/30/2023 Yellow  Yellow, Straw Final    Appearance, UA 11/30/2023 Clear  Clear Final    pH, UA 11/30/2023 7.5  5.0 - 8.0 Final    Specific Volcano, UA 11/30/2023 1.006  1.005 - 1.030 Final    Glucose, UA 11/30/2023 Negative  Negative Final    Ketones, UA 11/30/2023 Negative  Negative Final    Bilirubin, UA 11/30/2023 Negative  Negative Final    Blood, UA 11/30/2023 Negative  Negative Final    Protein, UA 11/30/2023 Negative  Negative Final    Leuk Esterase, UA 11/30/2023 Negative  Negative Final    Nitrite, UA 11/30/2023 Negative  Negative Final    Urobilinogen, UA 11/30/2023 0.2 E.U./dL  0.2 - 1.0 E.U./dL Final    WBC 11/30/2023 5.78  3.40 - 10.80 10*3/mm3 Final    RBC 11/30/2023 4.41  4.14 - 5.80 10*6/mm3 Final    Hemoglobin 11/30/2023 13.6  13.0 - 17.7 g/dL Final    Hematocrit 11/30/2023 39.6  37.5 - 51.0 % Final    MCV 11/30/2023 89.8  79.0 - 97.0 fL Final    MCH 11/30/2023 30.8  26.6 - 33.0 pg Final    MCHC 11/30/2023 34.3  31.5 - 35.7 g/dL Final    RDW 11/30/2023 13.8  12.3 - 15.4 % Final    RDW-SD 11/30/2023 45.7  37.0 - 54.0 fl Final    MPV  11/30/2023 9.9  6.0 - 12.0 fL Final    Platelets 11/30/2023 215  140 - 450 10*3/mm3 Final    Neutrophil % 11/30/2023 54.5  42.7 - 76.0 % Final    Lymphocyte % 11/30/2023 32.9  19.6 - 45.3 % Final    Monocyte % 11/30/2023 10.4  5.0 - 12.0 % Final    Eosinophil % 11/30/2023 1.7  0.3 - 6.2 % Final    Basophil % 11/30/2023 0.3  0.0 - 1.5 % Final    Immature Grans % 11/30/2023 0.2  0.0 - 0.5 % Final    Neutrophils, Absolute 11/30/2023 3.15  1.70 - 7.00 10*3/mm3 Final    Lymphocytes, Absolute 11/30/2023 1.90  0.70 - 3.10 10*3/mm3 Final    Monocytes, Absolute 11/30/2023 0.60  0.10 - 0.90 10*3/mm3 Final    Eosinophils, Absolute 11/30/2023 0.10  0.00 - 0.40 10*3/mm3 Final    Basophils, Absolute 11/30/2023 0.02  0.00 - 0.20 10*3/mm3 Final    Immature Grans, Absolute 11/30/2023 0.01  0.00 - 0.05 10*3/mm3 Final    nRBC 11/30/2023 0.0  0.0 - 0.2 /100 WBC Final         IMAGING:   US Scrotum & Testicles    Result Date: 11/30/2023    Tiny hydroceles noted bilaterally.   This report was finalized on 11/30/2023 12:10 PM by Dr. Bryan High MD.      XR spine lumbar 2 or 3 views    Result Date: 11/15/2023  No acute osseous findings. Images personally reviewed, interpreted and dictated by BELEM Johnson.          FL ARTHROGRAM HIP UNILAT W PAIN INJ    Result Date: 11/15/2023  No acute osseous findings. Questionable mild bilateral femoroacetabular impingement. Images personally reviewed, interpreted and dictated by BELEM Johnson.          XR chest AP portable    Result Date: 11/1/2023  No acute process on this portable exam. Images reviewed, interpreted, and dictated by Molly Marques MD    Results for orders placed in visit on 12/08/23    Stress Test With Myocardial Perfusion (1 Day)    Interpretation Summary    Myocardial perfusion imaging indicates a normal myocardial perfusion study with no evidence of ischemia.    Left ventricular ejection fraction is normal (Calculated EF = 63%).    Low risk for ischemic heart  disease.    TID 0.90.    Findings consistent with a normal ECG stress test.     No results found for this or any previous visit.          EXAM:  Constitutional:       General: Awake.      Appearance: Healthy appearance. Not in distress.   Eyes:      Conjunctiva/sclera: Conjunctivae normal.   HENT:      Head: Normocephalic and atraumatic.      Nose: Nose normal.    Mouth/Throat:      Pharynx: Oropharynx is clear.   Neck:      Thyroid: Thyroid normal.      Vascular: No carotid bruit or JVD.   Pulmonary:      Effort: Pulmonary effort is normal.      Breath sounds: Normal breath sounds.   Chest:      Chest wall: Not tender to palpatation.   Cardiovascular:      PMI at left midclavicular line. Normal rate. Regular rhythm. Normal S1 with normal intensity. Normal S2 with normal intensity.       Murmurs: There is no murmur.      No gallop.  No rub.   Pulses:     Intact distal pulses.   Edema:     Peripheral edema absent.   Abdominal:      General: Bowel sounds are normal. There is no distension.      Palpations: Abdomen is soft. There is no hepatomegaly.      Tenderness: There is no abdominal tenderness.   Musculoskeletal: Normal range of motion.      Cervical back: Normal range of motion. Skin:     General: Skin is warm and dry. There is no cyanosis.      Coloration: Skin is not jaundiced.   Neurological:      Mental Status: Alert and oriented to person, place and time.      Motor: Motor function is intact.      Gait: Gait is intact.   Psychiatric:         Attention and Perception: Attention and perception normal.         Speech: Speech normal.         Behavior: Behavior is cooperative.         Cognition and Memory: Cognition and memory normal.         Judgment: Judgment normal.          Procedure   Procedures       Assessment & Plan     Diagnoses and all orders for this visit:    1. Palpitations (Primary)  -     Cardiac Event Monitor    2. Other forms of angina pectoris          PLAN  1 Cardiac. patient with few risk  factors for heart disease including cigarette smoking.  Patient underwent a stress and echo which were normal.  He has some palpitations and dizziness.  Blood pressure was fluctuating on the low side on arrival which on repeat check was normal.  Will put a event monitor.  Patient is on Ranexa which will be continued for now.  May consider getting a CTA coronaries if he has more chest pains.  To see if cessation.  Advised cigarette cessation.           MEDS ORDERED DURING VISIT:    There are no discontinued medications.     No orders of the defined types were placed in this encounter.        Follow Up:   Return in about 4 months (around 5/30/2024) for Recheck.    Patient was given instructions and counseling regarding his condition or for health maintenance advice. Please see specific information pulled into the AVS if appropriate.   As always, Jennifer Fuentes PA  I appreciate very much the opportunity to participate in the cardiovascular care of your patients. Please do not hesitate to call me with any questions with regards to Maico Wilcox evaluation and management.         This document has been electronically signed by Miguel A Cates MD [facility], Interventional Cardiology  January 30, 2024 13:37 EST    Dictated Utilizing Dragon Dictation: Part of this note may be an electronic transcription/translation of spoken language to printed text using the Dragon Dictation System.

## 2024-04-26 ENCOUNTER — OFFICE VISIT (OUTPATIENT)
Dept: GASTROENTEROLOGY | Facility: CLINIC | Age: 34
End: 2024-04-26
Payer: COMMERCIAL

## 2024-04-26 VITALS
OXYGEN SATURATION: 98 % | HEIGHT: 74 IN | WEIGHT: 169 LBS | HEART RATE: 89 BPM | BODY MASS INDEX: 21.69 KG/M2 | SYSTOLIC BLOOD PRESSURE: 95 MMHG | DIASTOLIC BLOOD PRESSURE: 58 MMHG

## 2024-04-26 DIAGNOSIS — K21.9 GASTROESOPHAGEAL REFLUX DISEASE, UNSPECIFIED WHETHER ESOPHAGITIS PRESENT: ICD-10-CM

## 2024-04-26 DIAGNOSIS — R10.84 GENERALIZED ABDOMINAL PAIN: ICD-10-CM

## 2024-04-26 DIAGNOSIS — K62.5 BRBPR (BRIGHT RED BLOOD PER RECTUM): ICD-10-CM

## 2024-04-26 DIAGNOSIS — R19.7 DIARRHEA, UNSPECIFIED TYPE: ICD-10-CM

## 2024-04-26 DIAGNOSIS — R13.19 ESOPHAGEAL DYSPHAGIA: ICD-10-CM

## 2024-04-26 DIAGNOSIS — Z87.19 HISTORY OF BLOODY STOOLS: Primary | ICD-10-CM

## 2024-04-26 DIAGNOSIS — R63.4 UNINTENTIONAL WEIGHT LOSS: ICD-10-CM

## 2024-04-26 DIAGNOSIS — Z12.11 ENCOUNTER FOR SCREENING FOR MALIGNANT NEOPLASM OF COLON: ICD-10-CM

## 2024-04-26 PROCEDURE — 86140 C-REACTIVE PROTEIN: CPT | Performed by: NURSE PRACTITIONER

## 2024-04-26 PROCEDURE — 85025 COMPLETE CBC W/AUTO DIFF WBC: CPT | Performed by: NURSE PRACTITIONER

## 2024-04-26 PROCEDURE — 83540 ASSAY OF IRON: CPT | Performed by: NURSE PRACTITIONER

## 2024-04-26 PROCEDURE — 84466 ASSAY OF TRANSFERRIN: CPT | Performed by: NURSE PRACTITIONER

## 2024-04-26 PROCEDURE — 82728 ASSAY OF FERRITIN: CPT | Performed by: NURSE PRACTITIONER

## 2024-04-26 RX ORDER — POLYETHYLENE GLYCOL 3350 17 G/17G
510 POWDER, FOR SOLUTION ORAL TAKE AS DIRECTED
Qty: 510 G | Refills: 0 | Status: SHIPPED | OUTPATIENT
Start: 2024-04-26

## 2024-04-26 RX ORDER — BISACODYL 5 MG/1
20 TABLET, DELAYED RELEASE ORAL ONCE
Qty: 4 TABLET | Refills: 0 | Status: SHIPPED | OUTPATIENT
Start: 2024-04-26 | End: 2024-04-26

## 2024-04-26 NOTE — H&P (VIEW-ONLY)
DATE OF CONSULTATION:  4/26/2024    REASON FOR REFERRAL: BRBPR    REFERRING PHYSICIAN:  Sotero Mahmood PA-C    CHIEF COMPLAINT:  Generalized abdominal pain, bright red bleeding per rectum, dysphagia    HISTORY OF PRESENT ILLNESS:   Maico Wilcox is a very pleasant 33 y.o. male who is being seen today at the request of Sotero Mahmood PA-C for evaluation and treatment of bright red bleeding per rectum. For the past ~ 1 year, Mr. Wilcox reports he has been having intermittent episodes of generalized abdominal pain, diarrhea and bright red bleeding per rectum.  He denies melena.  His most recent episode was~2 months ago.  Most recent CBC from 11/30/2023 revealed normal hemoglobin.  CRP was normal as well.  At present, he reports having 2-3 BMs per day with stool type V-VI on BSS.  He denies family history of IBD or colon cancer.  Of note, he retains his gallbladder.  Patient has history of GERD and reports a few flares per month depending on dietary choices.  He is not currently on PPI therapy.  He denies having nausea or vomiting.  He complains of dysphagia with both solids and liquids.  He says he feels like he has a broken bone in his throat.  He reports unintentional weight loss ~10 lbs in 3-4 months.  Patient smokes marijuana.  He has no other complaints today.    PAST MEDICAL HISTORY:  Past Medical History:   Diagnosis Date    Anxiety     Depression     Self-injurious behavior     burning injurious behaviors by burning self with Cigarrette in the past 3 months    Substance abuse     Suicidal thoughts     Suicide attempt     about 6 years ago; 60 xanaxs and cut self did not have to seek medical attention     Tumors     lymphoid tumors    Withdrawal symptoms, alcohol     Withdrawal symptoms, drug or narcotic        PAST SURGICAL HISTORY:  Past Surgical History:   Procedure Laterality Date    HAND SURGERY Left     removed a piece of his muscle       FAMILY HISTORY:  Family History   Problem Relation Age of Onset     Anxiety disorder Mother     Bipolar disorder Mother     Depression Mother     Drug abuse Mother     OCD Mother     Paranoid behavior Mother     Schizophrenia Mother     Alcohol abuse Father     Anxiety disorder Father     Depression Father     Drug abuse Father     OCD Father     Paranoid behavior Father     Alcohol abuse Brother     Alcohol abuse Maternal Uncle     Alcohol abuse Paternal Uncle     Alcohol abuse Maternal Grandfather     Alcohol abuse Paternal Grandfather     Alcohol abuse Paternal Grandmother     ADD / ADHD Neg Hx     Dementia Neg Hx     Seizures Neg Hx     Self-Injurious Behavior  Neg Hx     Suicide Attempts Neg Hx        SOCIAL HISTORY:  Social History     Socioeconomic History    Marital status: Legally    Tobacco Use    Smoking status: Every Day     Current packs/day: 1.00     Average packs/day: 1 pack/day for 24.1 years (24.1 ttl pk-yrs)     Types: Cigarettes     Start date: 4/1/2000    Smokeless tobacco: Current   Vaping Use    Vaping status: Never Used   Substance and Sexual Activity    Alcohol use: No    Drug use: Yes     Types: Amphetamines, Marijuana    Sexual activity: Yes     Partners: Female     MEDICATIONS:  The current medication list was reviewed in the EMR    Current Outpatient Medications:     divalproex (DEPAKOTE) 125 MG DR tablet, Take 4 tablets by mouth 2 (Two) Times a Day., Disp: , Rfl:     haloperidol (HALDOL) 1 MG tablet, Take 1 tablet by mouth 2 (Two) Times a Day., Disp: , Rfl:     naproxen (EC NAPROSYN) 500 MG EC tablet, Take 1 tablet by mouth 2 (Two) Times a Day With Meals., Disp: 15 tablet, Rfl: 0    ranolazine (Ranexa) 500 MG 12 hr tablet, Take 1 tablet by mouth 2 (Two) Times a Day., Disp: 60 tablet, Rfl: 11    sulfamethoxazole-trimethoprim (Bactrim DS) 800-160 MG per tablet, Take 1 tablet by mouth Every 12 (Twelve) Hours., Disp: 20 tablet, Rfl: 0    traZODone (DESYREL) 100 MG tablet, Take 1 tablet by mouth every night at bedtime., Disp: , Rfl:     ALLERGIES:  "   Allergies   Allergen Reactions    Reglan [Metoclopramide] Other (See Comments)     hypertension     REVIEW OF SYSTEMS:    A comprehensive 14 point review of systems was performed.  Significant findings as mentioned above.  All other systems reviewed and are negative.        Physical Exam   Vital Signs: BP 95/58 (BP Location: Left arm, Patient Position: Sitting, Cuff Size: Small Adult)   Pulse 89   Ht 188 cm (74\")   Wt 76.7 kg (169 lb)   SpO2 98%   BMI 21.70 kg/m²    General: Well developed, well nourished, alert and oriented x 3, in no acute distress.   Head: ATNC   Eyes: PERRL, No evidence of conjunctivitis.   Nose: No nasal discharge.   Mouth: Oral mucosal membranes moist. No oral ulceration or hemorrhages.   Neck: Neck supple. No thyromegaly. No JVD.   Lungs: Clear in all fields to A&P without rales, rhonchi or wheezing.   Heart:  Regular rate and rhythm. No murmurs, rubs, or gallops.   Abdomen: Soft. Bowel sounds are normoactive. Nontender with palpation.   Extremities: No cyanosis or edema.   Neurologic: Grossly non-focal exam      RECENT LABS:  Lab Results   Component Value Date    WBC 5.78 11/30/2023    HGB 13.6 11/30/2023    HCT 39.6 11/30/2023    MCV 89.8 11/30/2023    RDW 13.8 11/30/2023     11/30/2023    NEUTRORELPCT 54.5 11/30/2023    LYMPHORELPCT 32.9 11/30/2023    MONORELPCT 10.4 11/30/2023    EOSRELPCT 1.7 11/30/2023    BASORELPCT 0.3 11/30/2023    NEUTROABS 3.15 11/30/2023    LYMPHSABS 1.90 11/30/2023       Lab Results   Component Value Date     11/30/2023    K 3.8 11/30/2023    CO2 32.4 (H) 11/30/2023     11/30/2023    BUN 6 11/30/2023    CREATININE 1.10 11/30/2023    EGFRIFNONA 83 10/25/2017    GLUCOSE 108 (H) 11/30/2023    CALCIUM 9.3 11/30/2023    ALKPHOS 51 11/30/2023    AST 12 11/30/2023    ALT 8 11/30/2023    BILITOT 0.3 11/30/2023    ALBUMIN 4.7 11/30/2023    PROTEINTOT 7.1 11/30/2023    MG 2.1 10/25/2017     ASSESSMENT & PLAN:  Maico Wilcox is a very pleasant 33 " y.o. male with    1.  BRBPR:  2.  Generalized abdominal pain:  3.  Diarrhea:  4.  Dysphagia (solids/liquids):  5.  Unintentional weight loss:    -Recommended EGD/colonoscopy to evaluate the above issues.  We discussed risks/benefits and patient is agreeable to proceed.  Will schedule.  In the interim, recommended daily fiber supplement with either Citrucel or Metamucil to help bulk stool and improve bowel movements.  -Will repeat CBC, iron panel, ferritin and CRP today.  -Patient will return to clinic following EGD/colonoscopy.    The patient was in agreement with the plan and all questions were answered to his satisfaction.     Thank you so much for allowing us to participate in the care of Maico Wilcox . Please do not hesitate to contact us with any questions or concerns.             Electronically Signed by: MARYELLEN Street , April 26, 2024 10:39 EDT       CC:   DIVINA Blanco Michelle Collins, PA

## 2024-04-27 LAB
BASOPHILS # BLD AUTO: 0.03 10*3/MM3 (ref 0–0.2)
BASOPHILS NFR BLD AUTO: 0.6 % (ref 0–1.5)
CRP SERPL-MCNC: <0.3 MG/DL (ref 0–0.5)
DEPRECATED RDW RBC AUTO: 46.6 FL (ref 37–54)
EOSINOPHIL # BLD AUTO: 0.11 10*3/MM3 (ref 0–0.4)
EOSINOPHIL NFR BLD AUTO: 2.1 % (ref 0.3–6.2)
ERYTHROCYTE [DISTWIDTH] IN BLOOD BY AUTOMATED COUNT: 14 % (ref 12.3–15.4)
FERRITIN SERPL-MCNC: 135 NG/ML (ref 30–400)
HCT VFR BLD AUTO: 43.2 % (ref 37.5–51)
HGB BLD-MCNC: 14.3 G/DL (ref 13–17.7)
IMM GRANULOCYTES # BLD AUTO: 0.02 10*3/MM3 (ref 0–0.05)
IMM GRANULOCYTES NFR BLD AUTO: 0.4 % (ref 0–0.5)
IRON 24H UR-MRATE: 141 MCG/DL (ref 59–158)
IRON SATN MFR SERPL: 45 % (ref 20–50)
LYMPHOCYTES # BLD AUTO: 1.92 10*3/MM3 (ref 0.7–3.1)
LYMPHOCYTES NFR BLD AUTO: 36.9 % (ref 19.6–45.3)
MCH RBC QN AUTO: 30 PG (ref 26.6–33)
MCHC RBC AUTO-ENTMCNC: 33.1 G/DL (ref 31.5–35.7)
MCV RBC AUTO: 90.6 FL (ref 79–97)
MONOCYTES # BLD AUTO: 0.41 10*3/MM3 (ref 0.1–0.9)
MONOCYTES NFR BLD AUTO: 7.9 % (ref 5–12)
NEUTROPHILS NFR BLD AUTO: 2.71 10*3/MM3 (ref 1.7–7)
NEUTROPHILS NFR BLD AUTO: 52.1 % (ref 42.7–76)
NRBC BLD AUTO-RTO: 0 /100 WBC (ref 0–0.2)
PLATELET # BLD AUTO: 247 10*3/MM3 (ref 140–450)
PMV BLD AUTO: 10.6 FL (ref 6–12)
RBC # BLD AUTO: 4.77 10*6/MM3 (ref 4.14–5.8)
TIBC SERPL-MCNC: 314 MCG/DL (ref 298–536)
TRANSFERRIN SERPL-MCNC: 211 MG/DL (ref 200–360)
WBC NRBC COR # BLD AUTO: 5.2 10*3/MM3 (ref 3.4–10.8)

## 2024-05-02 ENCOUNTER — TELEPHONE (OUTPATIENT)
Dept: GASTROENTEROLOGY | Facility: CLINIC | Age: 34
End: 2024-05-02
Payer: COMMERCIAL

## 2024-05-02 NOTE — TELEPHONE ENCOUNTER
I spoke to patient, notified him that per Abena, Please let patient know that labs from 4/26/2024 were unremarkable. He had normal blood counts. His iron stores were normal. CRP (inflammatory marker was normal). Patient verbalized understanding.

## 2024-05-02 NOTE — TELEPHONE ENCOUNTER
Please let patient know that labs from 4/26/2024 were unremarkable.  He had normal blood counts.  His iron stores were normal.  CRP (inflammatory marker was normal).

## 2024-05-13 PROBLEM — R13.19 ESOPHAGEAL DYSPHAGIA: Status: ACTIVE | Noted: 2024-04-26

## 2024-05-13 PROBLEM — K62.5 BRBPR (BRIGHT RED BLOOD PER RECTUM): Status: ACTIVE | Noted: 2024-04-26

## 2024-05-13 PROBLEM — R63.4 UNINTENTIONAL WEIGHT LOSS: Status: ACTIVE | Noted: 2024-04-26

## 2024-05-13 PROBLEM — R19.7 DIARRHEA: Status: ACTIVE | Noted: 2024-04-26

## 2024-05-13 PROBLEM — K21.9 GASTROESOPHAGEAL REFLUX DISEASE: Status: ACTIVE | Noted: 2024-04-26

## 2024-05-13 PROBLEM — Z87.19 HISTORY OF BLOODY STOOLS: Status: ACTIVE | Noted: 2024-04-26

## 2024-05-21 DIAGNOSIS — R19.7 DIARRHEA, UNSPECIFIED TYPE: ICD-10-CM

## 2024-05-21 DIAGNOSIS — K62.5 BRBPR (BRIGHT RED BLOOD PER RECTUM): ICD-10-CM

## 2024-05-21 DIAGNOSIS — Z87.19 HISTORY OF BLOODY STOOLS: Primary | ICD-10-CM

## 2024-05-21 DIAGNOSIS — R13.19 ESOPHAGEAL DYSPHAGIA: ICD-10-CM

## 2024-05-21 RX ORDER — BISACODYL 5 MG/1
20 TABLET, DELAYED RELEASE ORAL ONCE
Qty: 4 TABLET | Refills: 0 | Status: ON HOLD | OUTPATIENT
Start: 2024-05-21 | End: 2024-05-22

## 2024-05-21 RX ORDER — POLYETHYLENE GLYCOL 3350 17 G/17G
510 POWDER, FOR SOLUTION ORAL ONCE
Qty: 510 G | Refills: 0 | Status: SHIPPED | OUTPATIENT
Start: 2024-05-21 | End: 2024-05-22 | Stop reason: HOSPADM

## 2024-05-22 ENCOUNTER — ANESTHESIA (OUTPATIENT)
Dept: PERIOP | Facility: HOSPITAL | Age: 34
End: 2024-05-22
Payer: COMMERCIAL

## 2024-05-22 ENCOUNTER — ANESTHESIA EVENT (OUTPATIENT)
Dept: PERIOP | Facility: HOSPITAL | Age: 34
End: 2024-05-22
Payer: COMMERCIAL

## 2024-05-22 ENCOUNTER — HOSPITAL ENCOUNTER (OUTPATIENT)
Facility: HOSPITAL | Age: 34
Setting detail: HOSPITAL OUTPATIENT SURGERY
Discharge: HOME | End: 2024-05-22
Attending: INTERNAL MEDICINE | Admitting: INTERNAL MEDICINE
Payer: COMMERCIAL

## 2024-05-22 VITALS
OXYGEN SATURATION: 97 % | WEIGHT: 175 LBS | SYSTOLIC BLOOD PRESSURE: 107 MMHG | HEIGHT: 74 IN | BODY MASS INDEX: 22.46 KG/M2 | DIASTOLIC BLOOD PRESSURE: 70 MMHG | TEMPERATURE: 97.6 F | HEART RATE: 57 BPM | RESPIRATION RATE: 18 BRPM

## 2024-05-22 DIAGNOSIS — K21.9 GASTROESOPHAGEAL REFLUX DISEASE, UNSPECIFIED WHETHER ESOPHAGITIS PRESENT: ICD-10-CM

## 2024-05-22 DIAGNOSIS — R19.7 DIARRHEA, UNSPECIFIED TYPE: ICD-10-CM

## 2024-05-22 DIAGNOSIS — R63.4 UNINTENTIONAL WEIGHT LOSS: ICD-10-CM

## 2024-05-22 DIAGNOSIS — K62.5 BRBPR (BRIGHT RED BLOOD PER RECTUM): ICD-10-CM

## 2024-05-22 DIAGNOSIS — Z87.19 HISTORY OF BLOODY STOOLS: ICD-10-CM

## 2024-05-22 DIAGNOSIS — R13.19 ESOPHAGEAL DYSPHAGIA: ICD-10-CM

## 2024-05-22 PROCEDURE — 25010000002 FENTANYL CITRATE (PF) 50 MCG/ML SOLUTION: Performed by: NURSE ANESTHETIST, CERTIFIED REGISTERED

## 2024-05-22 PROCEDURE — 45380 COLONOSCOPY AND BIOPSY: CPT | Performed by: INTERNAL MEDICINE

## 2024-05-22 PROCEDURE — 43239 EGD BIOPSY SINGLE/MULTIPLE: CPT | Performed by: INTERNAL MEDICINE

## 2024-05-22 PROCEDURE — 25010000002 PROPOFOL 200 MG/20ML EMULSION: Performed by: NURSE ANESTHETIST, CERTIFIED REGISTERED

## 2024-05-22 PROCEDURE — 25810000003 LACTATED RINGERS PER 1000 ML: Performed by: ANESTHESIOLOGY

## 2024-05-22 PROCEDURE — 25010000002 MIDAZOLAM PER 1 MG: Performed by: ANESTHESIOLOGY

## 2024-05-22 RX ORDER — IPRATROPIUM BROMIDE AND ALBUTEROL SULFATE 2.5; .5 MG/3ML; MG/3ML
3 SOLUTION RESPIRATORY (INHALATION) ONCE AS NEEDED
Status: DISCONTINUED | OUTPATIENT
Start: 2024-05-22 | End: 2024-05-22 | Stop reason: HOSPADM

## 2024-05-22 RX ORDER — PANTOPRAZOLE SODIUM 40 MG/1
40 TABLET, DELAYED RELEASE ORAL DAILY
Qty: 90 TABLET | Refills: 5 | Status: SHIPPED | OUTPATIENT
Start: 2024-05-22 | End: 2024-05-23

## 2024-05-22 RX ORDER — SODIUM CHLORIDE, SODIUM LACTATE, POTASSIUM CHLORIDE, CALCIUM CHLORIDE 600; 310; 30; 20 MG/100ML; MG/100ML; MG/100ML; MG/100ML
125 INJECTION, SOLUTION INTRAVENOUS ONCE
Status: COMPLETED | OUTPATIENT
Start: 2024-05-22 | End: 2024-05-22

## 2024-05-22 RX ORDER — FENTANYL CITRATE 50 UG/ML
50 INJECTION, SOLUTION INTRAMUSCULAR; INTRAVENOUS
Status: DISCONTINUED | OUTPATIENT
Start: 2024-05-22 | End: 2024-05-22 | Stop reason: HOSPADM

## 2024-05-22 RX ORDER — OXYCODONE HYDROCHLORIDE AND ACETAMINOPHEN 5; 325 MG/1; MG/1
1 TABLET ORAL ONCE AS NEEDED
Status: DISCONTINUED | OUTPATIENT
Start: 2024-05-22 | End: 2024-05-22 | Stop reason: HOSPADM

## 2024-05-22 RX ORDER — SODIUM CHLORIDE 9 MG/ML
40 INJECTION, SOLUTION INTRAVENOUS AS NEEDED
Status: DISCONTINUED | OUTPATIENT
Start: 2024-05-22 | End: 2024-05-22 | Stop reason: HOSPADM

## 2024-05-22 RX ORDER — MIDAZOLAM HYDROCHLORIDE 1 MG/ML
1 INJECTION INTRAMUSCULAR; INTRAVENOUS
Status: DISCONTINUED | OUTPATIENT
Start: 2024-05-22 | End: 2024-05-22 | Stop reason: HOSPADM

## 2024-05-22 RX ORDER — SODIUM CHLORIDE, SODIUM LACTATE, POTASSIUM CHLORIDE, CALCIUM CHLORIDE 600; 310; 30; 20 MG/100ML; MG/100ML; MG/100ML; MG/100ML
100 INJECTION, SOLUTION INTRAVENOUS ONCE AS NEEDED
Status: DISCONTINUED | OUTPATIENT
Start: 2024-05-22 | End: 2024-05-22 | Stop reason: HOSPADM

## 2024-05-22 RX ORDER — SODIUM CHLORIDE 0.9 % (FLUSH) 0.9 %
10 SYRINGE (ML) INJECTION AS NEEDED
Status: DISCONTINUED | OUTPATIENT
Start: 2024-05-22 | End: 2024-05-22 | Stop reason: HOSPADM

## 2024-05-22 RX ORDER — ONDANSETRON 2 MG/ML
4 INJECTION INTRAMUSCULAR; INTRAVENOUS AS NEEDED
Status: DISCONTINUED | OUTPATIENT
Start: 2024-05-22 | End: 2024-05-22 | Stop reason: HOSPADM

## 2024-05-22 RX ORDER — PROPOFOL 10 MG/ML
INJECTION, EMULSION INTRAVENOUS AS NEEDED
Status: DISCONTINUED | OUTPATIENT
Start: 2024-05-22 | End: 2024-05-22 | Stop reason: SURG

## 2024-05-22 RX ORDER — LIDOCAINE HYDROCHLORIDE 20 MG/ML
INJECTION, SOLUTION EPIDURAL; INFILTRATION; INTRACAUDAL; PERINEURAL AS NEEDED
Status: DISCONTINUED | OUTPATIENT
Start: 2024-05-22 | End: 2024-05-22 | Stop reason: SURG

## 2024-05-22 RX ORDER — MEPERIDINE HYDROCHLORIDE 25 MG/ML
12.5 INJECTION INTRAMUSCULAR; INTRAVENOUS; SUBCUTANEOUS
Status: DISCONTINUED | OUTPATIENT
Start: 2024-05-22 | End: 2024-05-22 | Stop reason: HOSPADM

## 2024-05-22 RX ORDER — SODIUM CHLORIDE 0.9 % (FLUSH) 0.9 %
10 SYRINGE (ML) INJECTION EVERY 12 HOURS SCHEDULED
Status: DISCONTINUED | OUTPATIENT
Start: 2024-05-22 | End: 2024-05-22 | Stop reason: HOSPADM

## 2024-05-22 RX ORDER — FENTANYL CITRATE 50 UG/ML
INJECTION, SOLUTION INTRAMUSCULAR; INTRAVENOUS AS NEEDED
Status: DISCONTINUED | OUTPATIENT
Start: 2024-05-22 | End: 2024-05-22 | Stop reason: SURG

## 2024-05-22 RX ADMIN — PROPOFOL 100 MG: 10 INJECTION, EMULSION INTRAVENOUS at 08:33

## 2024-05-22 RX ADMIN — FENTANYL CITRATE 100 MCG: 50 INJECTION INTRAMUSCULAR; INTRAVENOUS at 08:33

## 2024-05-22 RX ADMIN — PROPOFOL 50 MG: 10 INJECTION, EMULSION INTRAVENOUS at 08:52

## 2024-05-22 RX ADMIN — MIDAZOLAM HYDROCHLORIDE 2 MG: 1 INJECTION, SOLUTION INTRAMUSCULAR; INTRAVENOUS at 08:30

## 2024-05-22 RX ADMIN — PROPOFOL 50 MG: 10 INJECTION, EMULSION INTRAVENOUS at 08:38

## 2024-05-22 RX ADMIN — LIDOCAINE HYDROCHLORIDE 100 MG: 20 INJECTION, SOLUTION EPIDURAL; INFILTRATION; INTRACAUDAL; PERINEURAL at 08:33

## 2024-05-22 RX ADMIN — PROPOFOL 50 MG: 10 INJECTION, EMULSION INTRAVENOUS at 08:48

## 2024-05-22 RX ADMIN — PROPOFOL 50 MG: 10 INJECTION, EMULSION INTRAVENOUS at 08:55

## 2024-05-22 RX ADMIN — PROPOFOL 50 MG: 10 INJECTION, EMULSION INTRAVENOUS at 08:43

## 2024-05-22 RX ADMIN — SODIUM CHLORIDE, POTASSIUM CHLORIDE, SODIUM LACTATE AND CALCIUM CHLORIDE: 600; 310; 30; 20 INJECTION, SOLUTION INTRAVENOUS at 08:30

## 2024-05-22 RX ADMIN — PROPOFOL 50 MG: 10 INJECTION, EMULSION INTRAVENOUS at 08:59

## 2024-05-22 NOTE — ANESTHESIA POSTPROCEDURE EVALUATION
Patient: Maico Wilcox    Procedure Summary       Date: 05/22/24 Room / Location: [facility] OR [address] [place] [address] COR OR    Anesthesia Start: 0830 Anesthesia Stop: 0904    Procedures:       ESOPHAGOGASTRODUODENOSCOPY WITH BIOPSY (Esophagus)      COLONOSCOPY FOR SCREENING Diagnosis:       History of bloody stools      BRBPR (bright red blood per rectum)      Diarrhea, unspecified type      Esophageal dysphagia      Gastroesophageal reflux disease, unspecified whether esophagitis present      Unintentional weight loss      (History of bloody stools [Z87.19])      (BRBPR (bright red blood per rectum) [K62.5])      (Diarrhea, unspecified type [R19.7])      (Esophageal dysphagia [R13.19])      (Gastroesophageal reflux disease, unspecified whether esophagitis present [K21.9])      (Unintentional weight loss [R63.4])    Surgeons: Ronit Parsons MD Provider: Rakesh Sampson DO    Anesthesia Type: general ASA Status: 2            Anesthesia Type: general    Vitals  Vitals Value Taken Time   /70 05/22/24 0930   Temp 97.6 °F (36.4 °C) 05/22/24 0906   Pulse 57 05/22/24 0932   Resp 18 05/22/24 0906   SpO2 97 % 05/22/24 0932           Post Anesthesia Care and Evaluation    Patient location during evaluation: PHASE II  Patient participation: complete - patient participated  Level of consciousness: awake and alert  Pain score: 1  Pain management: adequate    Airway patency: patent  Anesthetic complications: No anesthetic complications  PONV Status: controlled  Cardiovascular status: acceptable  Respiratory status: acceptable and room air  Hydration status: euvolemic  No anesthesia care post op

## 2024-05-22 NOTE — ANESTHESIA PREPROCEDURE EVALUATION
Anesthesia Evaluation     Patient summary reviewed and Nursing notes reviewed   no history of anesthetic complications:   NPO Solid Status: > 8 hours  NPO Liquid Status: > 8 hours           Airway   Mallampati: II  TM distance: >3 FB  Neck ROM: full  No difficulty expected  Dental    (+) poor dentition    Pulmonary - normal exam    breath sounds clear to auscultation  (+) a smoker Current, Smoked day of surgery, cigarettes,  Cardiovascular - normal exam    Rhythm: regular  Rate: normal    (+) angina      Neuro/Psych  (+) psychiatric history Depression  GI/Hepatic/Renal/Endo    (+) GERD, GI bleeding     Musculoskeletal (-) negative ROS    Abdominal  - normal exam   Substance History   (+) alcohol use, drug use     OB/GYN negative ob/gyn ROS         Other - negative ROS           Phys Exam Other: Very poor in situ dent.  Multiple broken/chipped/missing teeth with severe gum recession.   + heavy beard            Anesthesia Plan    ASA 2     general   total IV anesthesia  intravenous induction     Anesthetic plan, risks, benefits, and alternatives have been provided, discussed and informed consent has been obtained with: patient.  Pre-procedure education provided  Plan discussed with CRNA.    CODE STATUS:

## 2024-05-23 ENCOUNTER — OFFICE VISIT (OUTPATIENT)
Dept: CARDIOLOGY | Facility: CLINIC | Age: 34
End: 2024-05-23
Payer: COMMERCIAL

## 2024-05-23 VITALS
SYSTOLIC BLOOD PRESSURE: 103 MMHG | WEIGHT: 166 LBS | BODY MASS INDEX: 21.3 KG/M2 | OXYGEN SATURATION: 100 % | HEIGHT: 74 IN | HEART RATE: 55 BPM | DIASTOLIC BLOOD PRESSURE: 67 MMHG

## 2024-05-23 DIAGNOSIS — F33.3 MDD (MAJOR DEPRESSIVE DISORDER), RECURRENT, SEVERE, WITH PSYCHOSIS: ICD-10-CM

## 2024-05-23 DIAGNOSIS — K44.9 HIATAL HERNIA: ICD-10-CM

## 2024-05-23 DIAGNOSIS — I95.89 CHRONIC HYPOTENSION: ICD-10-CM

## 2024-05-23 DIAGNOSIS — R00.2 PALPITATIONS: Primary | ICD-10-CM

## 2024-05-23 DIAGNOSIS — F15.14 OTHER STIMULANT ABUSE WITH STIMULANT-INDUCED MOOD DISORDER: ICD-10-CM

## 2024-05-23 LAB — REF LAB TEST METHOD: NORMAL

## 2024-05-23 PROCEDURE — 1160F RVW MEDS BY RX/DR IN RCRD: CPT | Performed by: INTERNAL MEDICINE

## 2024-05-23 PROCEDURE — 1159F MED LIST DOCD IN RCRD: CPT | Performed by: INTERNAL MEDICINE

## 2024-05-23 PROCEDURE — 99214 OFFICE O/P EST MOD 30 MIN: CPT | Performed by: INTERNAL MEDICINE

## 2024-05-23 RX ORDER — MIDODRINE HYDROCHLORIDE 2.5 MG/1
2.5 TABLET ORAL 2 TIMES DAILY
Qty: 60 TABLET | Refills: 11 | Status: SHIPPED | OUTPATIENT
Start: 2024-05-23

## 2024-05-23 NOTE — PROGRESS NOTES
Office Note    Subjective     Maico Wilcox is a 33 y.o. male who presents to day for 4-month follow-up      Active Problems:  Problem List Items Addressed This Visit          Cardiac and Vasculature    Palpitations - Primary    Chronic hypotension       Gastrointestinal Abdominal     Hiatal hernia       Mental Health    MDD (major depressive disorder), recurrent, severe, with psychosis    Other stimulant abuse with stimulant-induced mood disorder       HPI  Is a pleasant 33-year-old gentleman past medical history significant for palpitations and chest pain.  Patient has abnormal ischemic evaluation in the past.  Patient is doing well.  Blood pressure is running on the low side.  Has had no dizziness lightheadedness or blackouts currently denies any lower extremity edema.  He does sometimes feels palpitations but do not have any symptoms from it.  Cardiogram January 2024 showed normal EF  Patient had EGD done yesterday which showed hiatal hernia.  Wore a event monitor from January 30, 2020 24-5 B19 2024.  He was noted to have heart rate ranged between 38 and 147.  Not symptomatic though.  No heart blocks noted.    PRIOR MEDS  Current Outpatient Medications on File Prior to Visit   Medication Sig Dispense Refill    divalproex (DEPAKOTE) 125 MG DR tablet Take 4 tablets by mouth 2 (Two) Times a Day.      haloperidol (HALDOL) 1 MG tablet Take 1 tablet by mouth 2 (Two) Times a Day.      ranolazine (Ranexa) 500 MG 12 hr tablet Take 1 tablet by mouth 2 (Two) Times a Day. 60 tablet 11    traZODone (DESYREL) 100 MG tablet Take 1 tablet by mouth every night at bedtime.      [DISCONTINUED] pantoprazole (PROTONIX) 40 MG EC tablet Take 1 tablet by mouth Daily. (Patient not taking: Reported on 5/23/2024) 90 tablet 5    [DISCONTINUED] polyethylene glycol (MIRALAX) 17 GM/SCOOP powder Take 510 g by mouth Take As Directed. Place 15 cap fulls of powder in 32 oz of liquid of choice at 4:00 and 10:00 PM (Patient not taking: Reported on  5/23/2024) 510 g 0     No current facility-administered medications on file prior to visit.       ALLERGIES  Reglan [metoclopramide]    HISTORY  Past Medical History:   Diagnosis Date    Anxiety     Depression     GERD (gastroesophageal reflux disease)     Self-injurious behavior     burning injurious behaviors by burning self with Cigarrette in the past 3 months    Substance abuse     Suicidal thoughts     Suicide attempt     about 6 years ago; 60 xanaxs and cut self did not have to seek medical attention     Tumors     lymphoid tumors    Withdrawal symptoms, alcohol     Withdrawal symptoms, drug or narcotic        Social History     Socioeconomic History    Marital status: Legally    Tobacco Use    Smoking status: Every Day     Current packs/day: 1.00     Average packs/day: 1 pack/day for 24.1 years (24.1 ttl pk-yrs)     Types: Cigarettes     Start date: 4/1/2000    Smokeless tobacco: Current   Vaping Use    Vaping status: Never Used   Substance and Sexual Activity    Alcohol use: No    Drug use: Yes     Types: Amphetamines, Marijuana    Sexual activity: Yes     Partners: Female       Family History   Problem Relation Age of Onset    Anxiety disorder Mother     Bipolar disorder Mother     Depression Mother     Drug abuse Mother     OCD Mother     Paranoid behavior Mother     Schizophrenia Mother     Alcohol abuse Father     Anxiety disorder Father     Depression Father     Drug abuse Father     OCD Father     Paranoid behavior Father     Alcohol abuse Brother     Alcohol abuse Maternal Uncle     Alcohol abuse Paternal Uncle     Alcohol abuse Maternal Grandfather     Alcohol abuse Paternal Grandfather     Alcohol abuse Paternal Grandmother     ADD / ADHD Neg Hx     Dementia Neg Hx     Seizures Neg Hx     Self-Injurious Behavior  Neg Hx     Suicide Attempts Neg Hx        Review of Systems   Respiratory:  Positive for cough. Negative for shortness of breath.    Cardiovascular:  Positive for palpitations.  "Negative for chest pain and leg swelling.   Neurological:  Positive for light-headedness. Negative for dizziness, syncope and weakness.       Objective     VITALS: /67 (BP Location: Left arm, Patient Position: Sitting, Cuff Size: Adult)   Pulse 55   Ht 188 cm (74\")   Wt 75.3 kg (166 lb)   SpO2 100%   BMI 21.31 kg/m²     LABS:   Office Visit on 04/26/2024   Component Date Value Ref Range Status    Iron 04/26/2024 141  59 - 158 mcg/dL Final    Iron Saturation (TSAT) 04/26/2024 45  20 - 50 % Final    Transferrin 04/26/2024 211  200 - 360 mg/dL Final    TIBC 04/26/2024 314  298 - 536 mcg/dL Final    Ferritin 04/26/2024 135.00  30.00 - 400.00 ng/mL Final    C-Reactive Protein 04/26/2024 <0.30  0.00 - 0.50 mg/dL Final    WBC 04/26/2024 5.20  3.40 - 10.80 10*3/mm3 Final    RBC 04/26/2024 4.77  4.14 - 5.80 10*6/mm3 Final    Hemoglobin 04/26/2024 14.3  13.0 - 17.7 g/dL Final    Hematocrit 04/26/2024 43.2  37.5 - 51.0 % Final    MCV 04/26/2024 90.6  79.0 - 97.0 fL Final    MCH 04/26/2024 30.0  26.6 - 33.0 pg Final    MCHC 04/26/2024 33.1  31.5 - 35.7 g/dL Final    RDW 04/26/2024 14.0  12.3 - 15.4 % Final    RDW-SD 04/26/2024 46.6  37.0 - 54.0 fl Final    MPV 04/26/2024 10.6  6.0 - 12.0 fL Final    Platelets 04/26/2024 247  140 - 450 10*3/mm3 Final    Neutrophil % 04/26/2024 52.1  42.7 - 76.0 % Final    Lymphocyte % 04/26/2024 36.9  19.6 - 45.3 % Final    Monocyte % 04/26/2024 7.9  5.0 - 12.0 % Final    Eosinophil % 04/26/2024 2.1  0.3 - 6.2 % Final    Basophil % 04/26/2024 0.6  0.0 - 1.5 % Final    Immature Grans % 04/26/2024 0.4  0.0 - 0.5 % Final    Neutrophils, Absolute 04/26/2024 2.71  1.70 - 7.00 10*3/mm3 Final    Lymphocytes, Absolute 04/26/2024 1.92  0.70 - 3.10 10*3/mm3 Final    Monocytes, Absolute 04/26/2024 0.41  0.10 - 0.90 10*3/mm3 Final    Eosinophils, Absolute 04/26/2024 0.11  0.00 - 0.40 10*3/mm3 Final    Basophils, Absolute 04/26/2024 0.03  0.00 - 0.20 10*3/mm3 Final    Immature Grans, Absolute " 04/26/2024 0.02  0.00 - 0.05 10*3/mm3 Final    nRBC 04/26/2024 0.0  0.0 - 0.2 /100 WBC Final         IMAGING:   XR ANKLE 2 VIEWS RIGHT Non-Weight Bearing    Result Date: 4/18/2024  No acute bony abnormality. Images reviewed, interpreted, and dictated by Dr. Sav Marsh. Transcribed by Edwin Hernandez PA-C.    Results for orders placed in visit on 12/08/23    Stress Test With Myocardial Perfusion (1 Day)    Interpretation Summary    Myocardial perfusion imaging indicates a normal myocardial perfusion study with no evidence of ischemia.    Left ventricular ejection fraction is normal (Calculated EF = 63%).    Low risk for ischemic heart disease.    TID 0.90.    Findings consistent with a normal ECG stress test.     No results found for this or any previous visit.          EXAM:  Constitutional:       General: Awake.      Appearance: Healthy appearance. Not in distress.   Eyes:      Conjunctiva/sclera: Conjunctivae normal.   HENT:      Head: Normocephalic and atraumatic.      Nose: Nose normal.    Mouth/Throat:      Pharynx: Oropharynx is clear.   Neck:      Thyroid: Thyroid normal.      Vascular: No carotid bruit or JVD.   Pulmonary:      Effort: Pulmonary effort is normal.      Breath sounds: Normal breath sounds.   Chest:      Chest wall: Not tender to palpatation.   Cardiovascular:      PMI at left midclavicular line. Normal rate. Regular rhythm. Normal S1 with normal intensity. Normal S2 with normal intensity.       Murmurs: There is no murmur.      No gallop.  No rub.   Pulses:     Intact distal pulses.   Edema:     Peripheral edema absent.   Abdominal:      General: Bowel sounds are normal. There is no distension.      Palpations: Abdomen is soft. There is no hepatomegaly.      Tenderness: There is no abdominal tenderness.   Musculoskeletal: Normal range of motion.      Cervical back: Normal range of motion. Skin:     General: Skin is warm and dry. There is no cyanosis.      Coloration: Skin is not jaundiced.    Neurological:      Mental Status: Alert and oriented to person, place and time.      Motor: Motor function is intact.      Gait: Gait is intact.   Psychiatric:         Attention and Perception: Attention and perception normal.         Speech: Speech normal.         Behavior: Behavior is cooperative.         Cognition and Memory: Cognition and memory normal.         Judgment: Judgment normal.          Procedure   Procedures       Assessment & Plan     Diagnoses and all orders for this visit:    1. Palpitations (Primary)    2. MDD (major depressive disorder), recurrent, severe, with psychosis    3. Other stimulant abuse with stimulant-induced mood disorder    4. Chronic hypotension    5. Hiatal hernia    Other orders  -     midodrine (PROAMATINE) 2.5 MG tablet; Take 1 tablet by mouth 2 (Two) Times a Day.  Dispense: 60 tablet; Refill: 11          PLAN  1 cardiac.  Patient with history of normal EF.  Chest pains which are better.  On ranolazine.  Will continue same.  Patient has had blood pressure on the low side.  Will consider adding ProAmatine twice daily.  Aggressive risk factor modification for coronary disease to continue    2.  Hypotension.  ProAmatine will be added.  3 cigarette cessation.  Discussed cigarette cessation patient is not wanting NicoDerm patch right now.  #4 palpitations.  Patient has had fluctuating heart rates on event monitor placed and February 2024.  Patient is not symptomatic from same.  Current heart rate is 55 today in clinic.  Will watch clinically for now.           MEDS ORDERED DURING VISIT:    Medications Discontinued During This Encounter   Medication Reason    pantoprazole (PROTONIX) 40 MG EC tablet *Therapy completed    polyethylene glycol (MIRALAX) 17 GM/SCOOP powder *Therapy completed        New Medications Ordered This Visit   Medications    midodrine (PROAMATINE) 2.5 MG tablet     Sig: Take 1 tablet by mouth 2 (Two) Times a Day.     Dispense:  60 tablet     Refill:  11          Follow Up:   Return in about 6 months (around 11/23/2024) for Recheck.    Patient was given instructions and counseling regarding his condition or for health maintenance advice. Please see specific information pulled into the AVS if appropriate.   As always, Jennifer Fuentes PA  I appreciate very much the opportunity to participate in the cardiovascular care of your patients. Please do not hesitate to call me with any questions with regards to Maico Wilcox evaluation and management.         This document has been electronically signed by Miguel A Cates MD [facility], Interventional Cardiology  May 23, 2024 14:18 EDT    Dictated Utilizing Dragon Dictation: Part of this note may be an electronic transcription/translation of spoken language to printed text using the Dragon Dictation System.

## 2024-07-18 ENCOUNTER — OFFICE VISIT (OUTPATIENT)
Dept: GASTROENTEROLOGY | Facility: CLINIC | Age: 34
End: 2024-07-18
Payer: COMMERCIAL

## 2024-07-18 VITALS
BODY MASS INDEX: 21.3 KG/M2 | HEART RATE: 89 BPM | DIASTOLIC BLOOD PRESSURE: 50 MMHG | WEIGHT: 166 LBS | HEIGHT: 74 IN | SYSTOLIC BLOOD PRESSURE: 94 MMHG

## 2024-07-18 DIAGNOSIS — R19.7 DIARRHEA, UNSPECIFIED TYPE: ICD-10-CM

## 2024-07-18 DIAGNOSIS — K21.9 GASTROESOPHAGEAL REFLUX DISEASE, UNSPECIFIED WHETHER ESOPHAGITIS PRESENT: Primary | ICD-10-CM

## 2024-07-18 DIAGNOSIS — R10.13 EPIGASTRIC PAIN: ICD-10-CM

## 2024-07-18 DIAGNOSIS — R13.19 ESOPHAGEAL DYSPHAGIA: ICD-10-CM

## 2024-07-18 PROCEDURE — 1159F MED LIST DOCD IN RCRD: CPT | Performed by: NURSE PRACTITIONER

## 2024-07-18 PROCEDURE — 1160F RVW MEDS BY RX/DR IN RCRD: CPT | Performed by: NURSE PRACTITIONER

## 2024-07-18 PROCEDURE — 99214 OFFICE O/P EST MOD 30 MIN: CPT | Performed by: NURSE PRACTITIONER

## 2024-07-18 RX ORDER — PANTOPRAZOLE SODIUM 40 MG/1
40 TABLET, DELAYED RELEASE ORAL DAILY
Qty: 30 TABLET | Refills: 5 | Status: SHIPPED | OUTPATIENT
Start: 2024-07-18

## 2024-07-18 NOTE — PROGRESS NOTES
DATE:  7/18/2024    REASON FOR FOLLOW UP: GERD, epigastric pain    REFERRING PHYSICIAN:  Sotero Mahmood PA-C     CHIEF COMPLAINT:  Follow up of EGD/colonoscopy    HISTORY OF PRESENT ILLNESS:   Maico Wilcox is a very pleasant 33 y.o. male who is being seen today at the request of Sotero Mahmood PA-C  for evaluation and treatment of bright red bleeding per rectum. For the past ~ 1 year, Mr. Wilcox reports he has been having intermittent episodes of generalized abdominal pain, diarrhea and bright red bleeding per rectum.  He denies melena.  His most recent episode was~2 months ago.  Most recent CBC from 11/30/2023 revealed normal hemoglobin.  CRP was normal as well.  At present, he reports having 2-3 BMs per day with stool type V-VI on BSS.  He denies family history of IBD or colon cancer.  Of note, he retains his gallbladder.  Patient has history of GERD and reports a few flares per month depending on dietary choices.  He is not currently on PPI therapy.  He denies having nausea or vomiting.  He complains of dysphagia with both solids and liquids.  He says he feels like he has a broken bone in his throat.  He reports unintentional weight loss ~10 lbs in 3-4 months.  Patient smokes marijuana.  He has no other complaints today.    INTERVAL HISTORY:  Mr. Wilcox presents today for follow up. Since his last visit, he underwent EGD/colonoscopy with Dr. Landin on 5/22/24. Biopsies were taken of the esophagus and revealed mild chronic esophagitis from acid reflux. Noted small hiatal hernia and gastritis.  Biopsies of the stomach were negative for H. pylori gastritis. Biopsies of the small bowel were negative for celiac disease. During colonoscopy, biopsies were taken of the rectum. The biopsies were normal. Findings were likely due to prep artifact. Repeat colonoscopy recommended at age 44. Following EGD, he was started on Protonix 40 mg PO daily which has been helpful with abdominal pain and dysphagia.  At present,  he reports feeling overall much better.  He does have occasional flares of epigastric pain depending on dietary choices.  He reports drinking soda or eating greasy or spicy foods exacerbate his symptoms.  His weight has recently been stable.  He continues to smoke marijuana daily.  He denies having nausea or vomiting.  He reports starting daily fiber supplement as advised and is now having normal, daily BMs.  He denies having melena or bright red bleeding per rectum.  He has no new complaints today.    PAST MEDICAL HISTORY:  Past Medical History:   Diagnosis Date    Anxiety     Depression     GERD (gastroesophageal reflux disease)     Self-injurious behavior     burning injurious behaviors by burning self with Cigarrette in the past 3 months    Substance abuse     Suicidal thoughts     Suicide attempt     about 6 years ago; 60 xanaxs and cut self did not have to seek medical attention     Tumors     lymphoid tumors    Withdrawal symptoms, alcohol     Withdrawal symptoms, drug or narcotic        PAST SURGICAL HISTORY:  Past Surgical History:   Procedure Laterality Date    COLONOSCOPY N/A 5/22/2024    Procedure: COLONOSCOPY FOR SCREENING;  Surgeon: Ronit Parsons MD;  Location: [facility];  Service: Gastroenterology;  Laterality: N/A;    ENDOSCOPY N/A 5/22/2024    Procedure: ESOPHAGOGASTRODUODENOSCOPY WITH BIOPSY;  Surgeon: Ronit Parsons MD;  Location: [facility] OR;  Service: Gastroenterology;  Laterality: N/A;    HAND SURGERY Left     removed a piece of his muscle       FAMILY HISTORY:  Family History   Problem Relation Age of Onset    Anxiety disorder Mother     Bipolar disorder Mother     Depression Mother     Drug abuse Mother     OCD Mother     Paranoid behavior Mother     Schizophrenia Mother     Alcohol abuse Father     Anxiety disorder Father     Depression Father     Drug abuse Father     OCD Father     Paranoid behavior Father     Alcohol abuse Brother     Alcohol abuse Maternal Uncle   "   Alcohol abuse Paternal Uncle     Alcohol abuse Maternal Grandfather     Alcohol abuse Paternal Grandfather     Alcohol abuse Paternal Grandmother     ADD / ADHD Neg Hx     Dementia Neg Hx     Seizures Neg Hx     Self-Injurious Behavior  Neg Hx     Suicide Attempts Neg Hx        SOCIAL HISTORY:  Social History     Socioeconomic History    Marital status: Legally    Tobacco Use    Smoking status: Every Day     Current packs/day: 1.00     Average packs/day: 1 pack/day for 24.3 years (24.3 ttl pk-yrs)     Types: Cigarettes     Start date: 4/1/2000    Smokeless tobacco: Current   Vaping Use    Vaping status: Never Used   Substance and Sexual Activity    Alcohol use: No    Drug use: Yes     Types: Amphetamines, Marijuana    Sexual activity: Yes     Partners: Female     MEDICATIONS:  The current medication list was reviewed in the EMR    Current Outpatient Medications:     divalproex (DEPAKOTE) 125 MG DR tablet, Take 4 tablets by mouth 2 (Two) Times a Day., Disp: , Rfl:     haloperidol (HALDOL) 1 MG tablet, Take 1 tablet by mouth 2 (Two) Times a Day., Disp: , Rfl:     midodrine (PROAMATINE) 2.5 MG tablet, Take 1 tablet by mouth 2 (Two) Times a Day., Disp: 60 tablet, Rfl: 11    ranolazine (Ranexa) 500 MG 12 hr tablet, Take 1 tablet by mouth 2 (Two) Times a Day., Disp: 60 tablet, Rfl: 11    traZODone (DESYREL) 100 MG tablet, Take 1 tablet by mouth every night at bedtime., Disp: , Rfl:     ALLERGIES:    Allergies   Allergen Reactions    Reglan [Metoclopramide] Other (See Comments)     hypertension     REVIEW OF SYSTEMS:    A comprehensive 14 point review of systems was performed.  Significant findings as mentioned above.  All other systems reviewed and are negative.      Physical Exam   Vital Signs: BP 94/50 (BP Location: Left arm, Patient Position: Sitting, Cuff Size: Small Adult)   Pulse 89   Ht 188 cm (74\")   Wt 75.3 kg (166 lb)   BMI 21.31 kg/m²    General: Well developed, well nourished, alert and " oriented x 3, in no acute distress.   Head: ATNC   Eyes: PERRL, No evidence of conjunctivitis.   Nose: No nasal discharge.   Mouth: Oral mucosal membranes moist. No oral ulceration or hemorrhages.   Neck: Neck supple. No thyromegaly. No JVD.   Lungs: Clear in all fields to A&P without rales, rhonchi or wheezing.   Heart:  Regular rate and rhythm. No murmurs, rubs, or gallops.   Abdomen: Soft. Bowel sounds are normoactive. Nontender with palpation.   Extremities: No cyanosis or edema.   Neurologic: Grossly non-focal exam    RECENT LABS:  Lab Results   Component Value Date    WBC 5.20 04/26/2024    HGB 14.3 04/26/2024    HCT 43.2 04/26/2024    MCV 90.6 04/26/2024    RDW 14.0 04/26/2024     04/26/2024    NEUTRORELPCT 52.1 04/26/2024    LYMPHORELPCT 36.9 04/26/2024    MONORELPCT 7.9 04/26/2024    EOSRELPCT 2.1 04/26/2024    BASORELPCT 0.6 04/26/2024    NEUTROABS 2.71 04/26/2024    LYMPHSABS 1.92 04/26/2024       Lab Results   Component Value Date     11/30/2023    K 3.8 11/30/2023    CO2 32.4 (H) 11/30/2023     11/30/2023    BUN 6 11/30/2023    CREATININE 1.10 11/30/2023    EGFRIFNONA 83 10/25/2017    GLUCOSE 108 (H) 11/30/2023    CALCIUM 9.3 11/30/2023    ALKPHOS 51 11/30/2023    AST 12 11/30/2023    ALT 8 11/30/2023    BILITOT 0.3 11/30/2023    ALBUMIN 4.7 11/30/2023    PROTEINTOT 7.1 11/30/2023    MG 2.1 10/25/2017     ASSESSMENT & PLAN:  Maico Wilcox is a very pleasant 33 y.o. male with    1.  GERD/epigastric pain:  2.  Diarrhea (now resolved):  3.  Dysphagia (improving):    -He underwent EGD/colonoscopy with Dr. Landin on 5/22/24. Biopsies were taken of the esophagus and revealed mild chronic esophagitis from acid reflux. Noted small hiatal hernia and gastritis.  Biopsies of the stomach were negative for H. pylori gastritis. Biopsies of the small bowel were negative for celiac disease. During colonoscopy, biopsies were taken of the rectum. The biopsies were normal. Findings were likely due to  prep artifact. Repeat colonoscopy recommended at age 44. We reviewed findings today.   -Continue Protonix 40 mg p.o. daily which has been helpful as above.  Refills provided.  - We discussed lifestyle changes including important dietary modifications, limiting triggers such as caffeine, chocolate, spicy foods, acidic foods, fried/greasy foods, food with high fat content and carbonated beverages.  Also recommended he cut back/abstain from smoking marijuana.  -Continue with daily fiber supplement.  -Patient will return to clinic in 6 months or sooner if needed      The patient was in agreement with the plan and all questions were answered to his satisfaction.     Thank you so much for allowing us to participate in the care of Maico Wilcox . Please do not hesitate to contact us with any questions or concerns.         Electronically Signed by: MARYELLEN Street , July 18, 2024 11:29 EDT       CC:   Jennifer Fuentes PA

## 2024-12-03 RX ORDER — RANOLAZINE 500 MG/1
500 TABLET, EXTENDED RELEASE ORAL 2 TIMES DAILY
Qty: 180 TABLET | Refills: 3 | Status: SHIPPED | OUTPATIENT
Start: 2024-12-03 | End: 2024-12-05

## 2024-12-05 ENCOUNTER — OFFICE VISIT (OUTPATIENT)
Dept: CARDIOLOGY | Facility: CLINIC | Age: 34
End: 2024-12-05
Payer: COMMERCIAL

## 2024-12-05 VITALS
DIASTOLIC BLOOD PRESSURE: 69 MMHG | WEIGHT: 176 LBS | HEART RATE: 67 BPM | OXYGEN SATURATION: 98 % | HEIGHT: 74 IN | SYSTOLIC BLOOD PRESSURE: 106 MMHG | BODY MASS INDEX: 22.59 KG/M2

## 2024-12-05 DIAGNOSIS — I95.89 CHRONIC HYPOTENSION: ICD-10-CM

## 2024-12-05 DIAGNOSIS — R55 SYNCOPE AND COLLAPSE: ICD-10-CM

## 2024-12-05 DIAGNOSIS — R00.2 PALPITATIONS: Primary | ICD-10-CM

## 2024-12-05 PROCEDURE — 99214 OFFICE O/P EST MOD 30 MIN: CPT | Performed by: INTERNAL MEDICINE

## 2024-12-05 PROCEDURE — 93000 ELECTROCARDIOGRAM COMPLETE: CPT | Performed by: INTERNAL MEDICINE

## 2024-12-05 RX ORDER — MIDODRINE HYDROCHLORIDE 5 MG/1
5 TABLET ORAL 2 TIMES DAILY
Qty: 60 TABLET | Refills: 12 | Status: SHIPPED | OUTPATIENT
Start: 2024-12-05

## 2024-12-05 NOTE — PROGRESS NOTES
Office Note    Subjective     aMico Wilcox is a 34 y.o. male who presents to day for follow-up      Active Problems:  Problem List Items Addressed This Visit          Cardiac and Vasculature    Palpitations - Primary    Chronic hypotension       Symptoms and Signs    Syncope and collapse       HPI  Patient pleasant 34-year-old gentleman past medical history significant for anxiety and stress.  Has had low blood pressures.  Patient had an episode of blackout while he was trying to blow a horn few months back.  He is usually does not have any blackouts.  Has had mild low blood pressures.  Denies any chest pains.  Denies any dizziness lightheadedness.  Last event monitor were in January showed heart rate speed up on him during the time he wore the monitor but no blackouts or heart blocks noted.    PRIOR MEDS  Current Outpatient Medications on File Prior to Visit   Medication Sig Dispense Refill    divalproex (DEPAKOTE) 125 MG DR tablet Take 4 tablets by mouth 2 (Two) Times a Day.      haloperidol (HALDOL) 1 MG tablet Take 1 tablet by mouth 2 (Two) Times a Day.      pantoprazole (PROTONIX) 40 MG EC tablet Take 1 tablet by mouth Daily. 30 tablet 5    traZODone (DESYREL) 100 MG tablet Take 1 tablet by mouth every night at bedtime.      [DISCONTINUED] midodrine (PROAMATINE) 2.5 MG tablet Take 1 tablet by mouth 2 (Two) Times a Day. 60 tablet 11    [DISCONTINUED] ranolazine (RANEXA) 500 MG 12 hr tablet TAKE 1 TABLET BY MOUTH TWICE DAILY 180 tablet 3     No current facility-administered medications on file prior to visit.       ALLERGIES  Reglan [metoclopramide]    HISTORY  Past Medical History:   Diagnosis Date    Anxiety     Depression     GERD (gastroesophageal reflux disease)     Self-injurious behavior     burning injurious behaviors by burning self with Cigarrette in the past 3 months    Substance abuse     Suicidal thoughts     Suicide attempt     about 6 years ago; 60 xanaxs and cut self did not have to seek medical  "attention     Tumors     lymphoid tumors    Withdrawal symptoms, alcohol     Withdrawal symptoms, drug or narcotic        Social History     Socioeconomic History    Marital status: Legally    Tobacco Use    Smoking status: Every Day     Current packs/day: 2.00     Types: Cigarettes    Smokeless tobacco: Current   Vaping Use    Vaping status: Some Days    Substances: Nicotine   Substance and Sexual Activity    Alcohol use: No    Drug use: Not Currently     Types: Amphetamines, Marijuana    Sexual activity: Yes     Partners: Female       Family History   Problem Relation Age of Onset    Anxiety disorder Mother     Bipolar disorder Mother     Depression Mother     Drug abuse Mother     OCD Mother     Paranoid behavior Mother     Schizophrenia Mother     Alcohol abuse Father     Anxiety disorder Father     Depression Father     Drug abuse Father     OCD Father     Paranoid behavior Father     Alcohol abuse Brother     Alcohol abuse Maternal Uncle     Alcohol abuse Paternal Uncle     Alcohol abuse Maternal Grandfather     Alcohol abuse Paternal Grandfather     Alcohol abuse Paternal Grandmother     ADD / ADHD Neg Hx     Dementia Neg Hx     Seizures Neg Hx     Self-Injurious Behavior  Neg Hx     Suicide Attempts Neg Hx        Review of Systems   Respiratory:  Negative for chest tightness and shortness of breath.    Cardiovascular:  Negative for chest pain, palpitations and leg swelling.   Neurological:  Positive for syncope and light-headedness. Negative for dizziness.       Objective     VITALS: /69 (BP Location: Left arm, Patient Position: Sitting, Cuff Size: Adult)   Pulse 67   Ht 188 cm (74\")   Wt 79.8 kg (176 lb)   SpO2 98%   BMI 22.60 kg/m²     LABS:   No visits with results within 3 Month(s) from this visit.   Latest known visit with results is:   Admission on 05/22/2024, Discharged on 05/22/2024   Component Date Value Ref Range Status    Reference Lab Report 05/22/2024    Final                 "    Value:[facility]  [address] [place] [address]  Phone: [phone] or [phone]  Fax: [phone]  Vern Lopes M.D., Medical Director    PATIENT NAME                           LABORATORY NO.  [id number]  BARTOLO DONOVAN                         [id number]  [id number]                         AGE              SEX  SSN           CLIENT REF #  [facility] NOAH              33      1990      [id number]   [id number]    [address]                     REQUESTING M.D.     ATTENDING M[place] [address]                   DANIKA PRITESH  DATE COLLECTED      DATE RECEIVED      DATE REPORTED  2024    DIAGNOSIS:  A.   DUODENUM, BIOPSY:  Benign duodenal mucosa with no significant diagnostic alterations  B.   GASTRIC ANTRUM, BIOPSY:  Benign gastric mucosa with no significant diagnostic alterations  No intestinal metaplasia or dysplasia identified  No Helicobacter pylori-like organisms                           identified on routine histologic  sections  C.   ESOPHAGUS, BIOPSY, DISTAL:  Minimal chronic esophagitis  No intestinal metaplasia, dysplasia or increased eosinophilic  inflammation identified  D.   RECTAL BIOPSY:  Benign colorectal mucosa with minimal superficial lamina propria  hemorrhage (see microscopic description)    CAM    CLINICAL HISTORY:  History of bloody stools, bright red blood per rectum, diarrhea, esophageal  dysphagia, gastro-esophageal reflux disease without esophagitis, unintentional  weight loss    SPECIMENS RECEIVED:  A.  DUODENUM, BIOPSY  B.  GASTRIC ANTRUM, BIOPSY  C.  ESOPHAGUS, BIOPSY, DISTAL  D.  RECTAL BIOPSY    MICROSCOPIC DESCRIPTION:  A.  Sections show strips of benign duodenal mucosa with and without  Brunner's glands.  Villous architecture appears to be preserved.  The  lamina propria contains a normal complement of mononuclear cells.  No  significant acute inflammation is  "seen.  Intraepithelial lymphocytes are not  increased.  There is no evidence of                           celiac disease.  B.  Tissue blocks are prepared and slides are examined microscopically. See  diagnosis for details.  C.  Tissue blocks are prepared and slides are examined microscopically. See  diagnosis for details.  D.  Sections demonstrate fragments of benign colorectal mucosa with normal  crypt architecture.  The superficial lamina propria shows patchy  hemorrhage, possibly representing procedure-related artifact.  No  significant inflammation is seen.  There is no evidence of dysplasia or  malignancy.    Professional interpretation rendered by Dorita Garrett M.D., SUZIE at  [facility], [address] [place] [address].    GROSS DESCRIPTION:  A.  Labeled \"small intestine, duodenum biopsy\".  Consists of multiple pieces  of tan soft tissue measuring 0.5 x 0.3 x 0.2 cm in aggregate and is filtered  and submitted entirely in 1 block.  MELO  B.  ,Labeled \"gastric, antrum biopsy\".  Consists of 2 pieces of tan soft tissue  measuring 0.4 x 0.3 x 0.2 cm in aggregate, filtered and                           submitted entirely  in 1 block.  C.  Labeled \"distal esophagus biopsy\".  Consists of 2 pieces of tan soft tissue  measuring 0.4 x 0.3 x 0.2 cm in aggregate, filtered and submitted entirely  in 1 block.  D.  Labeled \"rectal biopsy\".  Consists of 2 pieces of tan soft tissue measuring  0.4 x 0.3 x 0.2 cm in aggregate, filtered and submitted entirely in 1 block.    REVIEWED, DIAGNOSED AND ELECTRONICALLY  SIGNED BY:    Dorita Garrett M.D., DAYO.  CPT CODES:  88305x4           IMAGING:   EEG Awake and Drowsy - Clinic Performed    Result Date: 11/14/2024  NORMAL  AWAKE  EEG. 1.  There was no electrodiagnostic evidence for epileptogenicity on this recording. CLINICAL CORRELATION: An EEG without epileptiform discharges or electrographic seizure(s) does not exclude the possibility of epilepsy. If the " clinical suspicion of epilepsy remains, consider additional EEG recordings. However on this recording no epileptiform or ictal activity was recorded. GENTRY LAINEZ MD Neurologist    Results for orders placed in visit on 12/08/23    Stress Test With Myocardial Perfusion (1 Day)    Interpretation Summary    Myocardial perfusion imaging indicates a normal myocardial perfusion study with no evidence of ischemia.    Left ventricular ejection fraction is normal (Calculated EF = 63%).    Low risk for ischemic heart disease.    TID 0.90.    Findings consistent with a normal ECG stress test.     No results found for this or any previous visit.          EXAM:  Constitutional:       General: Awake.      Appearance: Healthy appearance. Not in distress.   Eyes:      Conjunctiva/sclera: Conjunctivae normal.   HENT:      Head: Normocephalic and atraumatic.      Nose: Nose normal.    Mouth/Throat:      Pharynx: Oropharynx is clear.   Neck:      Thyroid: Thyroid normal.      Vascular: No carotid bruit or JVD.   Pulmonary:      Effort: Pulmonary effort is normal.      Breath sounds: Normal breath sounds.   Chest:      Chest wall: Not tender to palpatation.   Cardiovascular:      PMI at left midclavicular line. Normal rate. Regular rhythm. Normal S1 with normal intensity. Normal S2 with normal intensity.       Murmurs: There is no murmur.      No gallop.  No rub.   Pulses:     Intact distal pulses.   Edema:     Peripheral edema absent.   Abdominal:      General: Bowel sounds are normal. There is no distension.      Palpations: Abdomen is soft. There is no hepatomegaly.      Tenderness: There is no abdominal tenderness.   Musculoskeletal: Normal range of motion.      Cervical back: Normal range of motion. Skin:     General: Skin is warm and dry. There is no cyanosis.      Coloration: Skin is not jaundiced.   Neurological:      Mental Status: Alert and oriented to person, place and time.      Motor: Motor function is intact.      Gait:  Gait is intact.   Psychiatric:         Attention and Perception: Attention and perception normal.         Speech: Speech normal.         Behavior: Behavior is cooperative.         Cognition and Memory: Cognition and memory normal.         Judgment: Judgment normal.          Procedure     ECG 12 Lead    Date/Time: 12/5/2024 1:40 PM  Performed by: Miguel A Cates MD    Authorized by: Miguel A Cates MD  Comparison: compared with previous ECG from 10/25/2017  Similar to previous ECG  Comments: Sinus rhythm at 60, normal axis, normal intervals             Assessment & Plan     Diagnoses and all orders for this visit:    1. Palpitations (Primary)    2. Syncope and collapse    3. Chronic hypotension    Other orders  -     midodrine (PROAMATINE) 5 MG tablet; Take 1 tablet by mouth 2 (Two) Times a Day.  Dispense: 60 tablet; Refill: 12          PLAN  #1 cardiac.  Patient with normal LVEF.  No angina symptoms.  Will get him off Ranexa.  Normal stress test in 2023  #2 syncope.  Patient had an episode of syncope while he was blowing horn. patient blood pressure is running on the low side.  Will go up on his midodrine.  Will get a event monitor done again.  May have an appropriate tachycardia and may benefit from ivabradine in future  #3 hypotension, go up on midodrine.           MEDS ORDERED DURING VISIT:    Medications Discontinued During This Encounter   Medication Reason    midodrine (PROAMATINE) 2.5 MG tablet     ranolazine (RANEXA) 500 MG 12 hr tablet *Therapy completed        New Medications Ordered This Visit   Medications    midodrine (PROAMATINE) 5 MG tablet     Sig: Take 1 tablet by mouth 2 (Two) Times a Day.     Dispense:  60 tablet     Refill:  12         Follow Up:   Return in about 3 months (around 3/5/2025) for Recheck.    Patient was given instructions and counseling regarding his condition or for health maintenance advice. Please see specific information pulled into the AVS if appropriate.   As always, Gina  SCOTT Astudillo  I appreciate very much the opportunity to participate in the cardiovascular care of your patients. Please do not hesitate to call me with any questions with regards to Maico Wilcox evaluation and management.         This document has been electronically signed by Miguel A Cates MD [facility], Interventional Cardiology  December 5, 2024 13:38 EST    Dictated Utilizing Dragon Dictation: Part of this note may be an electronic transcription/translation of spoken language to printed text using the Dragon Dictation System.

## 2025-01-12 LAB
CV ZIO BASELINE AVG BPM: 75
CV ZIO BASELINE BPM HIGH: 177
CV ZIO BASELINE BPM LOW: 41

## 2025-05-12 ENCOUNTER — OFFICE VISIT (OUTPATIENT)
Dept: CARDIOLOGY | Facility: CLINIC | Age: 35
End: 2025-05-12
Payer: COMMERCIAL

## 2025-05-12 VITALS
BODY MASS INDEX: 22.97 KG/M2 | HEIGHT: 74 IN | DIASTOLIC BLOOD PRESSURE: 76 MMHG | HEART RATE: 67 BPM | SYSTOLIC BLOOD PRESSURE: 120 MMHG | OXYGEN SATURATION: 97 % | WEIGHT: 179 LBS

## 2025-05-12 DIAGNOSIS — R55 SYNCOPE AND COLLAPSE: ICD-10-CM

## 2025-05-12 DIAGNOSIS — I95.89 CHRONIC HYPOTENSION: Primary | ICD-10-CM

## 2025-05-12 DIAGNOSIS — F33.3 MDD (MAJOR DEPRESSIVE DISORDER), RECURRENT, SEVERE, WITH PSYCHOSIS: ICD-10-CM

## 2025-05-12 DIAGNOSIS — F17.210 CONTINUOUS DEPENDENCE ON CIGARETTE SMOKING: ICD-10-CM

## 2025-05-12 PROCEDURE — 99213 OFFICE O/P EST LOW 20 MIN: CPT | Performed by: INTERNAL MEDICINE

## 2025-05-12 NOTE — PROGRESS NOTES
Office Note    Subjective     Maico Wilcox is a 34 y.o. male who presents to day for follow-up      Active Problems:  Problem List Items Addressed This Visit          Cardiac and Vasculature    Chronic hypotension - Primary       Mental Health    MDD (major depressive disorder), recurrent, severe, with psychosis       Symptoms and Signs    Syncope and collapse       Tobacco    Continuous dependence on cigarette smoking     Marco Gong comes in with his mother for follow-up.  He had an event monitor placed that showed no significant arrhythmias or pauses.  He did not report any dizziness during that time.  He has not had a syncopal episode in over a year.  Since starting on midodrine he has been doing well.  His symptoms are mainly orthostatic.  He is requesting his  license to be reinitiated.  Continues to smoke.  Occasionally drinks alcohol and smokes marijuana.  He complains of burning pain when he drinks soda and food actually relieves his burning pain.  He was diagnosed with GERD and he is taking Protonix.  He also was drinking a soda in the office.    HPI 12/5/2024  Patient pleasant 34-year-old gentleman past medical history significant for anxiety and stress.  Has had low blood pressures.  Patient had an episode of blackout while he was trying to blow a horn few months back.  He is usually does not have any blackouts.  Has had mild low blood pressures.  Denies any chest pains.  Denies any dizziness lightheadedness.  Last event monitor were in January showed heart rate speed up on him during the time he wore the monitor but no blackouts or heart blocks noted.    PRIOR MEDS  Current Outpatient Medications on File Prior to Visit   Medication Sig Dispense Refill    divalproex (DEPAKOTE) 125 MG DR tablet Take 4 tablets by mouth 2 (Two) Times a Day.      haloperidol (HALDOL) 1 MG tablet Take 1 tablet by mouth 2 (Two) Times a Day.      midodrine (PROAMATINE) 5 MG tablet Take 1 tablet by mouth 2 (Two)  Times a Day. 60 tablet 12    pantoprazole (PROTONIX) 40 MG EC tablet Take 1 tablet by mouth Daily. 30 tablet 5    traZODone (DESYREL) 100 MG tablet Take 1 tablet by mouth every night at bedtime.       No current facility-administered medications on file prior to visit.       ALLERGIES  Reglan [metoclopramide]    HISTORY  Past Medical History:   Diagnosis Date    Anxiety     Depression     GERD (gastroesophageal reflux disease)     Self-injurious behavior     burning injurious behaviors by burning self with Cigarrette in the past 3 months    Substance abuse     Suicidal thoughts     Suicide attempt     about 6 years ago; 60 xanaxs and cut self did not have to seek medical attention     Tumors     lymphoid tumors    Withdrawal symptoms, alcohol     Withdrawal symptoms, drug or narcotic        Social History     Socioeconomic History    Marital status: Legally    Tobacco Use    Smoking status: Every Day     Current packs/day: 2.00     Types: Cigarettes    Smokeless tobacco: Current   Vaping Use    Vaping status: Some Days    Substances: Nicotine   Substance and Sexual Activity    Alcohol use: No    Drug use: Not Currently     Types: Amphetamines, Marijuana    Sexual activity: Yes     Partners: Female       Family History   Problem Relation Age of Onset    Anxiety disorder Mother     Bipolar disorder Mother     Depression Mother     Drug abuse Mother     OCD Mother     Paranoid behavior Mother     Schizophrenia Mother     Alcohol abuse Father     Anxiety disorder Father     Depression Father     Drug abuse Father     OCD Father     Paranoid behavior Father     Alcohol abuse Brother     Alcohol abuse Maternal Uncle     Alcohol abuse Paternal Uncle     Alcohol abuse Maternal Grandfather     Alcohol abuse Paternal Grandfather     Alcohol abuse Paternal Grandmother     ADD / ADHD Neg Hx     Dementia Neg Hx     Seizures Neg Hx     Self-Injurious Behavior  Neg Hx     Suicide Attempts Neg Hx        Review of Systems  "  Respiratory:  Negative for chest tightness and shortness of breath.    Cardiovascular:  Negative for chest pain, palpitations and leg swelling.   Neurological:  Negative for dizziness, syncope and light-headedness.       Objective     VITALS: /76   Pulse 67   Ht 188 cm (74\")   Wt 81.2 kg (179 lb)   SpO2 97%   BMI 22.98 kg/m²     LABS:   No visits with results within 3 Month(s) from this visit.   Latest known visit with results is:   Office Visit on 12/05/2024   Component Date Value Ref Range Status    Heart rate (average) 12/05/2024 75   Final    Heart rate minimum 12/05/2024 41   Final    Heart rate maximum 12/05/2024 177   Final         IMAGING:   EEG Awake and Drowsy - Clinic Performed    Result Date: 11/14/2024  NORMAL  AWAKE  EEG. 1.  There was no electrodiagnostic evidence for epileptogenicity on this recording. CLINICAL CORRELATION: An EEG without epileptiform discharges or electrographic seizure(s) does not exclude the possibility of epilepsy. If the clinical suspicion of epilepsy remains, consider additional EEG recordings. However on this recording no epileptiform or ictal activity was recorded. GENTRY LAINEZ MD Neurologist    Results for orders placed in visit on 12/08/23    Stress Test With Myocardial Perfusion (1 Day)    Interpretation Summary    Myocardial perfusion imaging indicates a normal myocardial perfusion study with no evidence of ischemia.    Left ventricular ejection fraction is normal (Calculated EF = 63%).    Low risk for ischemic heart disease.    TID 0.90.    Findings consistent with a normal ECG stress test.     No results found for this or any previous visit.          EXAM:  Constitutional:       General: Awake.      Appearance: Not in distress.   Abdominal:      Tenderness: There is no abdominal tenderness.   Psychiatric:         Attention and Perception: Attention and perception normal.         Speech: Speech normal.         Behavior: Behavior is cooperative.         " Cognition and Memory: Cognition and memory normal.         Judgment: Judgment normal.          Procedure   Procedures       Assessment & Plan     Diagnoses and all orders for this visit:    1. Chronic hypotension (Primary)    2. MDD (major depressive disorder), recurrent, severe, with psychosis    3. Syncope and collapse    4. Continuous dependence on cigarette smoking            PLAN  - Patient was cleared to drive.  - He was strongly advised to quit smoking.  - He was advised to follow a good diet and exercise program.  - He was advised to change his eating and drinking habits.  - RTC in 6 months.           MEDS ORDERED DURING VISIT:    There are no discontinued medications.       No orders of the defined types were placed in this encounter.        Follow Up:   No follow-ups on file.    Patient was given instructions and counseling regarding his condition or for health maintenance advice. Please see specific information pulled into the AVS if appropriate.   As always, Jennifer Fuentes PA  I appreciate very much the opportunity to participate in the cardiovascular care of your patients. Please do not hesitate to call me with any questions with regards to Maico Wilcox evaluation and management.         This document has been electronically signed by Ifrah Whitten MD [facility], Interventional [facility]iology  May 12, 2025 15:34 EDT    Dictated Utilizing Dragon Dictation: Part of this note may be an electronic transcription/translation of spoken language to printed text using the Dragon Dictation System.

## 2025-08-27 ENCOUNTER — OFFICE VISIT (OUTPATIENT)
Dept: GASTROENTEROLOGY | Facility: CLINIC | Age: 35
End: 2025-08-27
Payer: COMMERCIAL

## 2025-08-27 VITALS
BODY MASS INDEX: 22.59 KG/M2 | HEART RATE: 75 BPM | DIASTOLIC BLOOD PRESSURE: 73 MMHG | SYSTOLIC BLOOD PRESSURE: 109 MMHG | WEIGHT: 176 LBS | HEIGHT: 74 IN

## 2025-08-27 DIAGNOSIS — K62.89 ANAL OR RECTAL PAIN: ICD-10-CM

## 2025-08-27 DIAGNOSIS — K64.8 INTERNAL HEMORRHOIDS: Primary | ICD-10-CM

## 2025-08-27 DIAGNOSIS — K64.8 INTERNAL HEMORRHOIDS: ICD-10-CM

## 2025-08-27 DIAGNOSIS — K62.5 RECTAL BLEEDING: ICD-10-CM

## 2025-08-27 DIAGNOSIS — K21.9 GASTROESOPHAGEAL REFLUX DISEASE, UNSPECIFIED WHETHER ESOPHAGITIS PRESENT: ICD-10-CM

## 2025-08-27 PROCEDURE — 99214 OFFICE O/P EST MOD 30 MIN: CPT | Performed by: NURSE PRACTITIONER

## 2025-08-27 PROCEDURE — 1159F MED LIST DOCD IN RCRD: CPT | Performed by: NURSE PRACTITIONER

## 2025-08-27 PROCEDURE — 1160F RVW MEDS BY RX/DR IN RCRD: CPT | Performed by: NURSE PRACTITIONER

## 2025-08-27 RX ORDER — PANTOPRAZOLE SODIUM 40 MG/1
40 TABLET, DELAYED RELEASE ORAL DAILY
Qty: 30 TABLET | Refills: 5 | Status: SHIPPED | OUTPATIENT
Start: 2025-08-27

## 2025-08-27 RX ORDER — HYDROCORTISONE ACETATE 25 MG/1
25 SUPPOSITORY RECTAL 2 TIMES DAILY PRN
Qty: 30 SUPPOSITORY | Refills: 0 | Status: SHIPPED | OUTPATIENT
Start: 2025-08-27 | End: 2025-08-27 | Stop reason: SDUPTHER

## 2025-08-27 RX ORDER — HYDROCORTISONE ACETATE 25 MG/1
25 SUPPOSITORY RECTAL 2 TIMES DAILY PRN
Qty: 30 SUPPOSITORY | Refills: 0 | Status: SHIPPED | OUTPATIENT
Start: 2025-08-27

## 2025-08-28 ENCOUNTER — TELEPHONE (OUTPATIENT)
Dept: GASTROENTEROLOGY | Facility: CLINIC | Age: 35
End: 2025-08-28
Payer: COMMERCIAL

## (undated) DEVICE — CONN Y IRR DISP 1P/U

## (undated) DEVICE — Device: Brand: DEFENDO AIR/WATER/SUCTION AND BIOPSY VALVE

## (undated) DEVICE — Device

## (undated) DEVICE — FRCP BX RADJAW4 NDL 2.8 240CM LG OG BX40

## (undated) DEVICE — THE BITE BLOCK MAXI, LATEX FREE STRAP IS USED TO PROTECT THE ENDOSCOPE INSERTION TUBE FROM BEING BITTEN BY THE PATIENT.

## (undated) DEVICE — ENDOGATOR AUXILIARY WATER JET CONNECTOR: Brand: ENDOGATOR